# Patient Record
Sex: MALE | Race: WHITE | Employment: OTHER | URBAN - METROPOLITAN AREA
[De-identification: names, ages, dates, MRNs, and addresses within clinical notes are randomized per-mention and may not be internally consistent; named-entity substitution may affect disease eponyms.]

---

## 2017-02-22 ENCOUNTER — HOSPITAL ENCOUNTER (OUTPATIENT)
Dept: SURGERY | Age: 82
Discharge: HOME | End: 2017-02-22

## 2017-02-22 VITALS
HEIGHT: 68 IN | OXYGEN SATURATION: 95 % | HEART RATE: 72 BPM | TEMPERATURE: 97.2 F | WEIGHT: 168.13 LBS | SYSTOLIC BLOOD PRESSURE: 151 MMHG | RESPIRATION RATE: 16 BRPM | BODY MASS INDEX: 25.48 KG/M2 | DIASTOLIC BLOOD PRESSURE: 62 MMHG

## 2017-02-22 RX ORDER — ACETAMINOPHEN 325 MG/1
325 TABLET ORAL
COMMUNITY

## 2017-02-22 RX ORDER — CEPHALEXIN 500 MG/1
500 CAPSULE ORAL 4 TIMES DAILY
COMMUNITY
End: 2017-03-16

## 2017-02-22 NOTE — PERIOP NOTES
Patient verified name, , and surgery as listed in [facility]. Type 1B surgery, walk in assessment complete. Labs per surgeon: none;   Labs per anesthesia protocol: none;   EKG: none needed per protocol    Hibiclens and instructions given per hospital policy. Patient provided with handouts including Guide to Surgery, Pain Management, Hand Hygiene, Blood Transfusion Education, and [facility] Brochure. Patient answered medical/surgical history questions at their best of ability. All prior to admission medications documented in [facility]. Original medication prescription bottle NOT visualized during patient appointment. Patient instructed to hold all vitamins 7 days prior to surgery and NSAIDS 5 days prior to surgery, patient verbalized understanding. Medications to be held none    Patient instructed to continue previous medications as prescribed prior to surgery and to take the following medications the day of surgery according to anesthesia guidelines with a small sip of water: Allopurinol, Aspirin 81 mg, Coreg, Cymbalta, Minocycline, Protonix. Pt and son reminded to bring Living Will papers on DOS. Kory Quinonez Patient taught back and verbalized understanding.

## 2017-02-28 ENCOUNTER — ANESTHESIA EVENT (OUTPATIENT)
Dept: SURGERY | Age: 82
End: 2017-02-28
Payer: MEDICARE

## 2017-03-01 ENCOUNTER — HOSPITAL ENCOUNTER (OUTPATIENT)
Age: 82
Setting detail: OUTPATIENT SURGERY
Discharge: HOME | End: 2017-03-01
Attending: OTOLARYNGOLOGY | Admitting: OTOLARYNGOLOGY
Payer: MEDICARE

## 2017-03-01 ENCOUNTER — ANESTHESIA (OUTPATIENT)
Dept: SURGERY | Age: 82
End: 2017-03-01
Payer: MEDICARE

## 2017-03-01 ENCOUNTER — SURGERY (OUTPATIENT)
Age: 82
End: 2017-03-01

## 2017-03-01 VITALS
TEMPERATURE: 98 F | OXYGEN SATURATION: 92 % | SYSTOLIC BLOOD PRESSURE: 160 MMHG | RESPIRATION RATE: 20 BRPM | DIASTOLIC BLOOD PRESSURE: 76 MMHG | HEART RATE: 83 BPM | WEIGHT: 168 LBS | BODY MASS INDEX: 25.46 KG/M2 | HEIGHT: 68 IN

## 2017-03-01 PROCEDURE — 77030008477 HC STYL SATN SLP COVD -A: Performed by: ANESTHESIOLOGY

## 2017-03-01 PROCEDURE — 77030002996 HC SUT SLK J&J -A: Performed by: OTOLARYNGOLOGY

## 2017-03-01 PROCEDURE — 74011000250 HC RX REV CODE- 250

## 2017-03-01 PROCEDURE — 74011250636 HC RX REV CODE- 250/636

## 2017-03-01 PROCEDURE — 76210000000 HC OR PH I REC 2 TO 2.5 HR: Performed by: OTOLARYNGOLOGY

## 2017-03-01 PROCEDURE — 74011250637 HC RX REV CODE- 250/637: Performed by: ANESTHESIOLOGY

## 2017-03-01 PROCEDURE — 77030020782 HC GWN BAIR PAWS FLX 3M -B: Performed by: ANESTHESIOLOGY

## 2017-03-01 PROCEDURE — 88305 TISSUE EXAM BY PATHOLOGIST: CPT | Performed by: OTOLARYNGOLOGY

## 2017-03-01 PROCEDURE — 77030031139 HC SUT VCRL2 J&J -A: Performed by: OTOLARYNGOLOGY

## 2017-03-01 PROCEDURE — 77030011640 HC PAD GRND REM COVD -A: Performed by: OTOLARYNGOLOGY

## 2017-03-01 PROCEDURE — 88173 CYTOPATH EVAL FNA REPORT: CPT | Performed by: OTOLARYNGOLOGY

## 2017-03-01 PROCEDURE — 76010000153 HC OR TIME 1.5 TO 2 HR: Performed by: OTOLARYNGOLOGY

## 2017-03-01 PROCEDURE — 88331 PATH CONSLTJ SURG 1 BLK 1SPC: CPT | Performed by: OTOLARYNGOLOGY

## 2017-03-01 PROCEDURE — 77030002974 HC SUT PLN J&J -A: Performed by: OTOLARYNGOLOGY

## 2017-03-01 PROCEDURE — 74011250636 HC RX REV CODE- 250/636: Performed by: ANESTHESIOLOGY

## 2017-03-01 PROCEDURE — 77030012623 HC STIM NRV HND MEDT -B: Performed by: OTOLARYNGOLOGY

## 2017-03-01 PROCEDURE — 74011250637 HC RX REV CODE- 250/637: Performed by: OTOLARYNGOLOGY

## 2017-03-01 PROCEDURE — 88342 IMHCHEM/IMCYTCHM 1ST ANTB: CPT

## 2017-03-01 PROCEDURE — 77030008467 HC STPLR SKN COVD -B: Performed by: OTOLARYNGOLOGY

## 2017-03-01 PROCEDURE — 76210000020 HC REC RM PH II FIRST 0.5 HR: Performed by: OTOLARYNGOLOGY

## 2017-03-01 PROCEDURE — 77030008703 HC TU ET UNCUF COVD -A: Performed by: ANESTHESIOLOGY

## 2017-03-01 PROCEDURE — 76060000035 HC ANESTHESIA 2 TO 2.5 HR: Performed by: OTOLARYNGOLOGY

## 2017-03-01 PROCEDURE — 94640 AIRWAY INHALATION TREATMENT: CPT

## 2017-03-01 PROCEDURE — 77030002916 HC SUT ETHLN J&J -A: Performed by: OTOLARYNGOLOGY

## 2017-03-01 PROCEDURE — 77030018836 HC SOL IRR NACL ICUM -A: Performed by: OTOLARYNGOLOGY

## 2017-03-01 PROCEDURE — 74011000250 HC RX REV CODE- 250: Performed by: OTOLARYNGOLOGY

## 2017-03-01 PROCEDURE — 77030011267 HC ELECTRD BLD COVD -A: Performed by: OTOLARYNGOLOGY

## 2017-03-01 RX ORDER — HYDROMORPHONE HYDROCHLORIDE 2 MG/ML
0.5 INJECTION, SOLUTION INTRAMUSCULAR; INTRAVENOUS; SUBCUTANEOUS
Status: DISCONTINUED | OUTPATIENT
Start: 2017-03-01 | End: 2017-03-01 | Stop reason: HOSPADM

## 2017-03-01 RX ORDER — MIDAZOLAM HYDROCHLORIDE 1 MG/ML
2 INJECTION, SOLUTION INTRAMUSCULAR; INTRAVENOUS
Status: DISCONTINUED | OUTPATIENT
Start: 2017-03-01 | End: 2017-03-01 | Stop reason: HOSPADM

## 2017-03-01 RX ORDER — FAMOTIDINE 20 MG/1
20 TABLET, FILM COATED ORAL ONCE
Status: COMPLETED | OUTPATIENT
Start: 2017-03-01 | End: 2017-03-01

## 2017-03-01 RX ORDER — ALBUTEROL SULFATE 0.83 MG/ML
SOLUTION RESPIRATORY (INHALATION)
Status: COMPLETED
Start: 2017-03-01 | End: 2017-03-01

## 2017-03-01 RX ORDER — ROCURONIUM BROMIDE 10 MG/ML
INJECTION, SOLUTION INTRAVENOUS AS NEEDED
Status: DISCONTINUED | OUTPATIENT
Start: 2017-03-01 | End: 2017-03-01 | Stop reason: HOSPADM

## 2017-03-01 RX ORDER — FENTANYL CITRATE 50 UG/ML
100 INJECTION, SOLUTION INTRAMUSCULAR; INTRAVENOUS ONCE
Status: DISCONTINUED | OUTPATIENT
Start: 2017-03-01 | End: 2017-03-01 | Stop reason: HOSPADM

## 2017-03-01 RX ORDER — ACETAMINOPHEN 325 MG/1
650 TABLET ORAL ONCE
Status: COMPLETED | OUTPATIENT
Start: 2017-03-01 | End: 2017-03-01

## 2017-03-01 RX ORDER — PROPOFOL 10 MG/ML
INJECTION, EMULSION INTRAVENOUS AS NEEDED
Status: DISCONTINUED | OUTPATIENT
Start: 2017-03-01 | End: 2017-03-01 | Stop reason: HOSPADM

## 2017-03-01 RX ORDER — GLYCOPYRROLATE 0.2 MG/ML
INJECTION INTRAMUSCULAR; INTRAVENOUS AS NEEDED
Status: DISCONTINUED | OUTPATIENT
Start: 2017-03-01 | End: 2017-03-01 | Stop reason: HOSPADM

## 2017-03-01 RX ORDER — SODIUM CHLORIDE, SODIUM LACTATE, POTASSIUM CHLORIDE, CALCIUM CHLORIDE 600; 310; 30; 20 MG/100ML; MG/100ML; MG/100ML; MG/100ML
75 INJECTION, SOLUTION INTRAVENOUS CONTINUOUS
Status: DISCONTINUED | OUTPATIENT
Start: 2017-03-01 | End: 2017-03-01 | Stop reason: HOSPADM

## 2017-03-01 RX ORDER — HYDROCODONE BITARTRATE AND ACETAMINOPHEN 5; 325 MG/1; MG/1
2 TABLET ORAL AS NEEDED
Status: DISCONTINUED | OUTPATIENT
Start: 2017-03-01 | End: 2017-03-01 | Stop reason: HOSPADM

## 2017-03-01 RX ORDER — FENTANYL CITRATE 50 UG/ML
INJECTION, SOLUTION INTRAMUSCULAR; INTRAVENOUS AS NEEDED
Status: DISCONTINUED | OUTPATIENT
Start: 2017-03-01 | End: 2017-03-01 | Stop reason: HOSPADM

## 2017-03-01 RX ORDER — BACITRACIN ZINC 500 UNIT/G
OINTMENT (GRAM) TOPICAL AS NEEDED
Status: DISCONTINUED | OUTPATIENT
Start: 2017-03-01 | End: 2017-03-01 | Stop reason: HOSPADM

## 2017-03-01 RX ORDER — LIDOCAINE HYDROCHLORIDE 10 MG/ML
0.1 INJECTION INFILTRATION; PERINEURAL AS NEEDED
Status: DISCONTINUED | OUTPATIENT
Start: 2017-03-01 | End: 2017-03-01 | Stop reason: HOSPADM

## 2017-03-01 RX ORDER — LIDOCAINE HYDROCHLORIDE 20 MG/ML
INJECTION, SOLUTION EPIDURAL; INFILTRATION; INTRACAUDAL; PERINEURAL AS NEEDED
Status: DISCONTINUED | OUTPATIENT
Start: 2017-03-01 | End: 2017-03-01 | Stop reason: HOSPADM

## 2017-03-01 RX ORDER — ALBUTEROL SULFATE 0.83 MG/ML
2.5 SOLUTION RESPIRATORY (INHALATION)
Status: COMPLETED | OUTPATIENT
Start: 2017-03-01 | End: 2017-03-01

## 2017-03-01 RX ORDER — SUCCINYLCHOLINE CHLORIDE 20 MG/ML
INJECTION INTRAMUSCULAR; INTRAVENOUS AS NEEDED
Status: DISCONTINUED | OUTPATIENT
Start: 2017-03-01 | End: 2017-03-01 | Stop reason: HOSPADM

## 2017-03-01 RX ORDER — ONDANSETRON 2 MG/ML
INJECTION INTRAMUSCULAR; INTRAVENOUS AS NEEDED
Status: DISCONTINUED | OUTPATIENT
Start: 2017-03-01 | End: 2017-03-01 | Stop reason: HOSPADM

## 2017-03-01 RX ORDER — DEXAMETHASONE SODIUM PHOSPHATE 4 MG/ML
INJECTION, SOLUTION INTRA-ARTICULAR; INTRALESIONAL; INTRAMUSCULAR; INTRAVENOUS; SOFT TISSUE AS NEEDED
Status: DISCONTINUED | OUTPATIENT
Start: 2017-03-01 | End: 2017-03-01 | Stop reason: HOSPADM

## 2017-03-01 RX ORDER — OXYCODONE HYDROCHLORIDE 5 MG/1
5 TABLET ORAL
Status: DISCONTINUED | OUTPATIENT
Start: 2017-03-01 | End: 2017-03-01 | Stop reason: HOSPADM

## 2017-03-01 RX ORDER — MIDAZOLAM HYDROCHLORIDE 1 MG/ML
5 INJECTION, SOLUTION INTRAMUSCULAR; INTRAVENOUS ONCE
Status: DISCONTINUED | OUTPATIENT
Start: 2017-03-01 | End: 2017-03-01 | Stop reason: HOSPADM

## 2017-03-01 RX ORDER — OXYMETAZOLINE HCL 0.05 %
SPRAY, NON-AEROSOL (ML) NASAL AS NEEDED
Status: DISCONTINUED | OUTPATIENT
Start: 2017-03-01 | End: 2017-03-01 | Stop reason: HOSPADM

## 2017-03-01 RX ORDER — LIDOCAINE HYDROCHLORIDE AND EPINEPHRINE 10; 10 MG/ML; UG/ML
INJECTION, SOLUTION INFILTRATION; PERINEURAL AS NEEDED
Status: DISCONTINUED | OUTPATIENT
Start: 2017-03-01 | End: 2017-03-01 | Stop reason: HOSPADM

## 2017-03-01 RX ADMIN — PROPOFOL 40 MG: 10 INJECTION, EMULSION INTRAVENOUS at 08:39

## 2017-03-01 RX ADMIN — OXYMETAZOLINE HYDROCHLORIDE 15 ML: 5 SPRAY NASAL at 09:22

## 2017-03-01 RX ADMIN — HYDROMORPHONE HYDROCHLORIDE 0.5 MG: 2 INJECTION, SOLUTION INTRAMUSCULAR; INTRAVENOUS; SUBCUTANEOUS at 10:15

## 2017-03-01 RX ADMIN — LIDOCAINE HYDROCHLORIDE AND EPINEPHRINE 6 ML: 10; 10 INJECTION, SOLUTION INFILTRATION; PERINEURAL at 09:44

## 2017-03-01 RX ADMIN — ALBUTEROL SULFATE 2.5 MG: 0.83 SOLUTION RESPIRATORY (INHALATION) at 11:42

## 2017-03-01 RX ADMIN — ALBUTEROL SULFATE 2.5 MG: 2.5 SOLUTION RESPIRATORY (INHALATION) at 11:42

## 2017-03-01 RX ADMIN — DEXAMETHASONE SODIUM PHOSPHATE 10 MG: 4 INJECTION, SOLUTION INTRA-ARTICULAR; INTRALESIONAL; INTRAMUSCULAR; INTRAVENOUS; SOFT TISSUE at 08:33

## 2017-03-01 RX ADMIN — FAMOTIDINE 20 MG: 20 TABLET ORAL at 07:16

## 2017-03-01 RX ADMIN — PROPOFOL 30 MG: 10 INJECTION, EMULSION INTRAVENOUS at 09:28

## 2017-03-01 RX ADMIN — Medication 14 G: at 09:17

## 2017-03-01 RX ADMIN — ROCURONIUM BROMIDE 5 MG: 10 INJECTION, SOLUTION INTRAVENOUS at 08:12

## 2017-03-01 RX ADMIN — ONDANSETRON 4 MG: 2 INJECTION INTRAMUSCULAR; INTRAVENOUS at 09:52

## 2017-03-01 RX ADMIN — FENTANYL CITRATE 50 MCG: 50 INJECTION, SOLUTION INTRAMUSCULAR; INTRAVENOUS at 08:55

## 2017-03-01 RX ADMIN — GLYCOPYRROLATE 0.2 MG: 0.2 INJECTION INTRAMUSCULAR; INTRAVENOUS at 09:24

## 2017-03-01 RX ADMIN — PROPOFOL 100 MG: 10 INJECTION, EMULSION INTRAVENOUS at 08:12

## 2017-03-01 RX ADMIN — SUCCINYLCHOLINE CHLORIDE 100 MG: 20 INJECTION INTRAMUSCULAR; INTRAVENOUS at 08:12

## 2017-03-01 RX ADMIN — LIDOCAINE HYDROCHLORIDE 60 MG: 20 INJECTION, SOLUTION EPIDURAL; INFILTRATION; INTRACAUDAL; PERINEURAL at 08:12

## 2017-03-01 RX ADMIN — ACETAMINOPHEN 650 MG: 325 TABLET, FILM COATED ORAL at 11:22

## 2017-03-01 RX ADMIN — SODIUM CHLORIDE, SODIUM LACTATE, POTASSIUM CHLORIDE, AND CALCIUM CHLORIDE: 600; 310; 30; 20 INJECTION, SOLUTION INTRAVENOUS at 09:19

## 2017-03-01 RX ADMIN — PROPOFOL 50 MG: 10 INJECTION, EMULSION INTRAVENOUS at 08:26

## 2017-03-01 RX ADMIN — SODIUM CHLORIDE, SODIUM LACTATE, POTASSIUM CHLORIDE, AND CALCIUM CHLORIDE 75 ML/HR: 600; 310; 30; 20 INJECTION, SOLUTION INTRAVENOUS at 07:39

## 2017-03-01 RX ADMIN — FENTANYL CITRATE 50 MCG: 50 INJECTION, SOLUTION INTRAMUSCULAR; INTRAVENOUS at 08:12

## 2017-03-01 NOTE — ANESTHESIA POSTPROCEDURE EVALUATION
Post-Anesthesia Evaluation and Assessment    Patient: Junito Bartlett MRN: [id number]  SSN: [id number]    YOB: 1928  Age: 80 y.o. Sex: male       Cardiovascular Function/Vital Signs  Visit Vitals    /76    Pulse 83    Temp 36.7 °C (98 °F)    Resp 20    Ht 5' 8\" (1.727 m)    Wt 76.2 kg (168 lb)    SpO2 92%    BMI 25.54 kg/m2       Patient is status post general anesthesia for Procedure(s):  EXCISIONAL BIOPSY OF RIGHT NECK MASS WITH FROZEN SECTION  DIRECT LARYNGOSCOPY WITH BIOPSY. Nausea/Vomiting: None    Postoperative hydration reviewed and adequate. Pain:  Pain Scale 1: Visual (03/01/17 1047)  Pain Intensity 1: 0 (03/01/17 1047)   Managed    Neurological Status:   Neuro (WDL): Within Defined Limits (03/01/17 1047)  Neuro  Neurologic State: Alert (03/01/17 1047)  Orientation Level: Oriented to person;Oriented to place (03/01/17 1047)  LUE Motor Response: Purposeful (03/01/17 1047)  LLE Motor Response: Purposeful (03/01/17 1047)  RUE Motor Response: Purposeful (03/01/17 1047)  RLE Motor Response: Purposeful (03/01/17 1047)   At baseline    Mental Status and Level of Consciousness: Arousable    Pulmonary Status:   O2 Device: Room air (03/01/17 1157)   Adequate oxygenation and airway patent    Complications related to anesthesia: None    Post-anesthesia assessment completed. No concerns. SpO2 stable after breathing treatment (has been >90% on RA). VS otherwise stable. Pain controlled. I believe he his safe for discharge.     Signed By: Sydni Juarez MD     March 1, 2017

## 2017-03-01 NOTE — ANESTHESIA PREPROCEDURE EVALUATION
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 Cardiovascular    Hypertension: well controlled              Exercise tolerance: >4 METS: Uses walker, cannot do stairs, but doesn't get SOB on flat surface     GI/Hepatic/Renal     GERD: well controlled           Endo/Other        Arthritis and cancer (H/o colon cancer, s/p surgery and chemo)     Other Findings              Physical Exam    Airway  Mallampati: III  TM Distance: > 6 cm  Neck ROM: normal range of motion   Mouth opening: Normal     Cardiovascular  Regular rate and rhythm,  S1 and S2 normal,  no murmur, click, rub, or gallop  Rhythm: regular  Rate: normal         Dental    Dentition: Edentulous     Pulmonary  Breath sounds clear to auscultation               Abdominal  GI exam deferred       Other Findings            Anesthetic Plan    ASA: 3  Anesthesia type: general          Induction: Intravenous  Anesthetic plan and risks discussed with: Patient and Son / Daughter

## 2017-03-01 NOTE — BRIEF OP NOTE
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]      Procedure(s):  EXCISIONAL BIOPSY OF RIGHT NECK MASS WITH FROZEN SECTION  DIRECT LARYNGOSCOPY WITH BIOPSY    Surgeon(s) and Role:     * Starr Benson MD - Primary    Surgical Staff:  Circ-1: Darvin Alvarez RN  Circ-2: Amanda Fonseca RN  Circ-Relief: Lydia Stein RN  Scrub Tech-1: Fadumo Dotson  Scrub Tech-2: Hector Frye    Event Time In   Incision Start 0825   Incision Close 0950     Anesthesia: General     IVF: 1600 cc    Estimated Blood Loss: 40 cc    Specimens:   ID Type Source Tests Collected by Time Destination   1 : right neck mass Frozen Section Neck  Satrr Benson MD 3/1/2017 0900 Pathology   2 : Right nasopharynx bx Preservative Sinus  Starr Benson MD 3/1/2017 4113 Pathology   3 : Right pyriform sinus Preservative Sinus  Starr Benson MD 3/1/2017 0940 Pathology   4 : Right base of tongue Preservative Tongue  Starr Benson MD 3/1/2017 3198 Pathology   5 : Right tonsil bx Preservative Tonsil  Starr Benson MD 3/1/2017 6627 Pathology   1 : Right neck mass Special Studies (Specify) Neck  Starr Benson MD 3/1/2017 0761 Cytology      Findings: 2.5 firm R level 5 neck mass- positive for SCCA on frozen section- no obvious lesions on laryngoscopy other than some firmness of R NP    Complications: none    Implants: * No implants in log *

## 2017-03-01 NOTE — IP AVS SNAPSHOT
[address] [place]  
[phone] Patient: Kendell Lisa MRN: [id number] :10/17/1928 You are allergic to the following Allergen Reactions Codeine Nausea Only Recent Documentation Height  
  
  
  
  
  
 1.727 m Emergency Contacts Name Discharge Info Relation Home Work Mobile Elvira Diggs DISCHARGE CAREGIVER [3] Spouse [3] [phone] Pavithra Restrepo CAREGIVER [3] Daughter [21] [phone] Carolyn Soco [22] [phone] About your hospitalization You were admitted on:  2017 You last received care in the:  [facility] PACU You were discharged on:  2017 Unit phone number:  [phone] Why you were hospitalized Your primary diagnosis was:  Not on File Providers Seen During Your Hospitalizations Provider Role Specialty Primary office phone Jennie Gtz MD Attending Provider Otolaryngology [phone] Your Primary Care Physician (PCP) Primary Care Physician Office Phone Office Fax Boo Bal [address] [facility],  [id number] Follow-up Information Follow up With Details Comments Contact Info Claire Brown   [address] [id number] [phone] Jennie Gtz MD Schedule an appointment as soon as possible for a visit in 1 week(s)  [address] [address] 
[phone] Your Appointments 2017  2:00 PM EST Follow Up with Claire Brown [address] [facility] ([facility]) [address] [facility] [address] [place] [id number]  
[phone] 2017  3:45 PM EST  
POST OP with MD Min Gautamusnell [address] ENT 8[address] [facility]) [address]  
[phone] Current Discharge Medication List  
  
CONTINUE these medications which have CHANGED Dose & Instructions Dispensing Information Comments Morning Noon Evening Bedtime  
 allopurinol 100 mg tablet Commonly known as:  Nani Gosling What changed:  additional instructions Your next dose is: Today, Tomorrow Other:  _________ Dose:  200 mg Take 2 Tabs by mouth daily. Quantity:  60 Tab Refills:  5  
     
   
   
   
  
 carvedilol 12.5 mg tablet Commonly known as:  Coralondra Resendez What changed:   
- how much to take 
- how to take this - when to take this 
- additional instructions Your next dose is: Today, Tomorrow Other:  _________ Take by mouth 1 tablet in AM and 2 tablets in PM  
 Quantity:  270 Tab Refills:  3 DULoxetine 60 mg capsule Commonly known as:  CYMBALTA What changed:  additional instructions Your next dose is: Today, Tomorrow Other:  _________ Dose:  60 mg Take 1 Cap by mouth daily. Quantity:  90 Cap Refills:  3  
     
   
   
   
  
 furosemide 20 mg tablet Commonly known as:  LASIX What changed:   
- how much to take 
- how to take this - when to take this 
- additional instructions Your next dose is: Today, Tomorrow Other:  _________ Take by mouth daily as directed in the morning Quantity:  90 Tab Refills:  11  
     
   
   
   
  
 pantoprazole 20 mg tablet Commonly known as:  PROTONIX What changed:  additional instructions Your next dose is: Today, Tomorrow Other:  _________ Dose:  20 mg Take 1 Tab by mouth daily. Quantity:  90 Tab Refills:  3  
     
   
   
   
  
 tamsulosin 0.4 mg capsule Commonly known as:  FLOMAX What changed:  when to take this Your next dose is: Today, Tomorrow Other:  _________ Dose:  0.4 mg Take 1 Cap by mouth daily. Quantity:  90 Cap Refills:  3 CONTINUE these medications which have NOT CHANGED Dose & Instructions Dispensing Information Comments Morning Noon Evening Bedtime  
 aspirin delayed-release 81 mg tablet Your next dose is: Today, Tomorrow Other:  _________ Dose:  81 mg Take 81 mg by mouth daily. Take / use AM day of surgery  per anesthesia protocols. Indications: OSTEOARTHRITIS Refills:  0  
     
   
   
   
  
 * HYDROcodone-acetaminophen  mg tablet Commonly known as:  Felicitas Hoang Your next dose is: Today, Tomorrow Other:  _________ Dose:  1 Tab Take 1 Tab by mouth nightly. Max Daily Amount: 1 Tab. Quantity:  30 Tab Refills:  0  
     
   
   
   
  
 * HYDROcodone-acetaminophen 5-325 mg per tablet Commonly known as:  Felicitas Hoang Your next dose is: Today, Tomorrow Other:  _________  
   
   
 1-2 tabs every 4-6 hrs prn pain Quantity:  30 Tab Refills:  0 KEFLEX 500 mg capsule Generic drug:  cephALEXin Your next dose is: Today, Tomorrow Other:  _________ Dose:  500 mg Take 500 mg by mouth four (4) times daily. Refills:  0  
     
   
   
   
  
 magnesium oxide 400 mg tablet Commonly known as:  MAG-OX Your next dose is: Today, Tomorrow Other:  _________ Dose:  400 mg Take 1 Tab by mouth daily. Quantity:  90 Tab Refills:  2  
     
   
   
   
  
 minocycline 50 mg tablet Commonly known as:  Divine Born Your next dose is: Today, Tomorrow Other:  _________ Dose:  50 mg Take 50 mg by mouth daily. Taking for continual eye infection ~preventative maintenance  Take / use AM day of surgery  per anesthesia protocols. Refills:  0  
     
   
   
   
  
 * ondansetron 4 mg disintegrating tablet Commonly known as:  ZOFRAN ODT Your next dose is: Today, Tomorrow Other:  _________ Dose:  4 mg Take 1 Tab by mouth every eight (8) hours as needed for Nausea. Quantity:  90 Tab Refills:  1  
     
   
   
   
  
 * ondansetron 8 mg disintegrating tablet Commonly known as:  ZOFRAN ODT Your next dose is: Today, Tomorrow Other:  _________ Dose:  8 mg Take 1 Tab by mouth every eight (8) hours as needed for Nausea. Quantity:  8 Tab Refills:  0  
     
   
   
   
  
 OTHER Your next dose is: Today, Tomorrow Other:  _________  
   
   
 daily. REnavite tablet Refills:  0  
     
   
   
   
  
 TYLENOL 325 mg tablet Generic drug:  acetaminophen Your next dose is: Today, Tomorrow Other:  _________ Dose:  325 mg Take 325 mg by mouth every four (4) hours as needed for Pain. Indications: Arthritic Pain Refills:  0  
     
   
   
   
  
 * Notice: This list has 4 medication(s) that are the same as other medications prescribed for you. Read the directions carefully, and ask your doctor or other care provider to review them with you. Discharge Instructions   
  
-Please keep pressure dressing in place for 24 hrs- then you may remove and leave the incision open to air. You may shower/bathe as long as this neck incision stays away from the water. 
-Please apply a thin layer of Vaseline over the neck incision 7-8 times a day to help the sutures dissolve 
-No strenuous activity or heavy lifting for 2 wks 
-There will be some mild oozing from nose/throat for first few days due to the biopsies. Instructions Following Ambulatory Surgery Activity · As tolerated and directed by your doctor · Bathe or shower as directed by your doctor Diet · Clear liquids until no nausea or vomiting; then light diet for the first day · Advance to regular diet on second day, unless your doctor orders otherwise · If nausea and vomiting continues, call your doctor Pain · Take pain medication as directed by your doctor ·  Call your doctor if pain is NOT relieved by medication · DO NOT take aspirin or blood thinners until directed by your doctor Follow-Up Phone Calls · Will be made nursing staff · If you have any problems, call your doctor as needed Call your doctor if 
· Excessive bleeding that does not stop after holding mild pressure over the area · Temperature of 101 degrees F or above · Redness,excessive swelling or bruising, and/or green or yellow, smelly discharge from incision After Anesthesia · For the first 24 hours: DO NOT Drive, Drink alcoholic beverages, or Make important decisions · Be aware of dizziness following anesthesia and while taking pain medication Discharge Orders None ACO Transitions of Care Introducing [employer] offers a voluntary care coordination program to provide high quality service and care to [facility] fee-for-service beneficiaries. Marlen Watson was designed to help you enhance your health and well-being through the following services: ? Transitions of Care  support for individuals who are transitioning from one care setting to another (example: Hospital to home). ? Chronic and Complex Care Coordination  support for individuals and caregivers of those with serious or chronic illnesses or with more than one chronic (ongoing) condition and those who take a number of different medications. If you meet specific medical criteria, a [facility] [address] may call you directly to coordinate your care with your primary care physician and your other care providers. For questions about the [facility] programs, please, contact your physicians office. For general questions or additional information about Accountable Care Organizations: 
Please visit www.medicare.gov/acos. html or call 1-800-MEDICARE (5-985.146.3614) TTY users should call 2-974.760.7574. Introducing [facility]! Jannet Braswell introduces [employer] patient portal. Now you can access parts of your medical record, email your doctor's office, and request medication refills online. 1. In your internet browser, go to [email or web address] "[email or web address] 2. Click on the First Time User? Click Here link in the Sign In box. You will see the New Member Sign Up page. 3. Enter your [employer] Access Code exactly as it appears below. You will not need to use this code after youve completed the sign-up process. If you do not sign up before the expiration date, you must request a new code. · [employer] Access Code: [email or web address] Expires: 4/5/2017  7:33 AM 
 
4. Enter the last four digits of your Social Security Number (xxxx) and Date of Birth (mm/dd/yyyy) as indicated and click Submit. You will be taken to the next sign-up page. 5. Create a [employer] ID. This will be your [employer] login ID and cannot be changed, so think of one that is secure and easy to remember. 6. Create a [employer] password. You can change your password at any time. 7. Enter your Password Reset Question and Answer. This can be used at a later time if you forget your password. 8. Enter your e-mail address. You will receive e-mail notification when new information is available in [address] 9. Click Sign Up. You can now view and download portions of your medical record. 10. Click the Download Summary menu link to download a portable copy of your medical information. If you have questions, please visit the Frequently Asked Questions section of the [employer] website. Remember, [employer] is NOT to be used for urgent needs. For medical emergencies, dial 911. Now available from your iPhone and Android! General Information Please provide this summary of care documentation to your next provider. Patient Signature:  ____________________________________________________________ Date:  ____________________________________________________________  
  
Paulene Greener Provider Signature:  ____________________________________________________________ Date:  ____________________________________________________________

## 2017-03-01 NOTE — H&P
79 yo male seen recently for an enlarged R neck mass- presented over 1 mo ago and has been getting larger in size. Mild soreness over the mass but no other sxs. He has had wt loss of 35 lbs in last 6 mo. He saw Dr. Miguel Sparks recently and underwent FNA which revealed some atypical squamous cell, suspicious for malignancy. Visit Vitals    /67 (BP 1 Location: Left arm, BP Patient Position: At rest;Supine)    Pulse 66    Temp 98.5 °F (36.9 °C)    Resp 16    Ht 5' 8\" (1.727 m)    Wt 76.2 kg (168 lb)    SpO2 96%    BMI 25.54 kg/m2     General: NAD, well-appearing. Gait assisted w/ walker. Neuro: No gross neuro deficits. CN's II-XII intact. No facial weakness. Eyes: EOMI. Pupils reactive. No periorbital edema/ecchymosis. Skin: No facial erythema, rashes or concerning lesions. Nose: No external deviations or saddling. Intranasally, septum is deviated off to R side inferiorly, without perforations, nasal mucosa appears healthy with no erythema, mucopurulence, or polyps. Mouth: Edentulous- upper plate in place. Moist mucus membranes, normal tongue/palate mobility, no concerning mucosal lesions. Oropharynx clear with no erythema/exudate, no tonsillar hypertrophy. Ears: Normal appearing auricles, no hematomas. EACs clear with no cerumen impaction, healthy canal skin, TM's intact with no perforations or retraction pockets. No middle ear effusions. Neck: Soft, supple, there is a 2 cm firm but mobile and fairly superficial R level 5 neck mass- posterior to SCM. No thyromegaly or palpable thyroid nodules. No surgical scars. Lymphatics: No palpable cervical LAD on L side. Resp: No audible stridor or wheezing. Extremities: No clubbing or cyanosis. A/P:  He has a 2 cm R level 5 neck mass which was suspicious for malignancy on recent FNA. On endoscopy, there was some fullness along the R side of his nasopharynx but no other concerning areas.  His CT scan revealed only this enlarged R level 5 neck mass and no other pathologic LAD or concerning mucosal lesions. I recommend proceeding w/ excisional bx of R neck mass w/ frozen section and possible DL/E w/ biopsy. I discussed all the risks of surgery including bleeding/hematoma, infection, ear numbness, shoulder weakness, recurrence, damage to lips/gums, and need for further treatment/procedures, and he would like to proceed.     José Miguel Jones MD

## 2017-03-01 NOTE — DISCHARGE INSTRUCTIONS
-Please keep pressure dressing in place for 24 hrs- then you may remove and leave the incision open to air. You may shower/bathe as long as this neck incision stays away from the water.  -Please apply a thin layer of Vaseline over the neck incision 7-8 times a day to help the sutures dissolve  -No strenuous activity or heavy lifting for 2 wks  -There will be some mild oozing from nose/throat for first few days due to the biopsies.       Instructions Following Ambulatory Surgery    Activity  · As tolerated and directed by your doctor  · Bathe or shower as directed by your doctor    Diet  · Clear liquids until no nausea or vomiting; then light diet for the first day  · Advance to regular diet on second day, unless your doctor orders otherwise  · If nausea and vomiting continues, call your doctor    Pain  · Take pain medication as directed by your doctor  ·  Call your doctor if pain is NOT relieved by medication  · DO NOT take aspirin or blood thinners until directed by your doctor    Follow-Up Phone Calls  · Will be made nursing staff  · If you have any problems, call your doctor as needed    Call your doctor if  · Excessive bleeding that does not stop after holding mild pressure over the area  · Temperature of 101 degrees F or above  · Redness,excessive swelling or bruising, and/or green or yellow, smelly discharge from incision    After Anesthesia  · For the first 24 hours: DO NOT Drive, Drink alcoholic beverages, or Make important decisions  · Be aware of dizziness following anesthesia and while taking pain medication

## 2017-03-02 NOTE — OP NOTES
[address] 65   OPERATIVE REPORT       Name:  Darling See   MR#:  [id number]   :  10/17/1928   Account #:  [de-identified]   Date of Adm:  2017       DATE OF SURGERY: 2017     PREOPERATIVE DIAGNOSIS: Right neck mass. 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 PROCEDURE   1. Excisional biopsy of deep right cervical lymph node. 2. Direct laryngoscopy with biopsy. OPERATIVE SURGEON: Tonio Anderson. Escobar Romano MD    ANESTHESIA: General endotracheal.    ANESTHESIOLOGIST: Alycia Layne MD    OPERATIVE FINDINGS   1. There was a 2.5 cm, firm lymph node within the right level 5. Excisional biopsy of this lymph node was performed. 2. The spinal accessory nerve was running directly through the   mass and had to be sacrificed. 3. Frozen section pathology confirmed squamous cell carcinoma   and, therefore, direct laryngoscopy was performed with speculative biopsies   along the right side. 4. There was some firmness of the right nasopharynx, but no   obvious ulcerative or exophytic lesions within the upper   aerodigestive tract. INTRAVENOUS FLUID: 1600 mL crystalloid. ESTIMATED BLOOD LOSS: 40 mL. SPECIMENS   1. Right neck mass--frozen. 2. Right nasopharynx--permanent. 3. Right pyriform sinus--permanent. 4. Right base of tongue--permanent. 5. Right tonsil biopsy--permanent. DRAINS: None. COMPLICATIONS: None. DISPOSITION: PACU, then home. CONDITION: Stable. BRIEF HISTORY: Mr. Beatriz Santacruz is an 69-year-old male who presented to   my office with an enlarging right posterior neck mass. He   underwent fine needle aspirate Dr. Dustin Quiros, which revealed some   atypical squamous cells concerning for malignancy. I scoped him   in the office and there was some mild asymmetry along the right   nasopharynx, but no other abnormalities of the upper   aerodigestive tract.  Decision was made to take him to the   operating room for excisional biopsy of this right neck mass,   followed by direct laryngoscopy, depending on pathology. DESCRIPTION OF PROCEDURE: The patient was brought back to the   operating room, placed on the table in a supine position. General endotracheal anesthesia was inducted without any   complications. Once the patient was adequately sedated, a total   of 6 mL of 1% lidocaine with 1:100,000 epinephrine was injected   along the planned incision line. He was then sterilely prepped   and draped in the usual fashion. I began by designing a 4 cm incision along a natural neck skin   crease of the right posterior neck. I incised through the skin   and dermis with a 15 blade and then began careful blunt   dissection through some scant platysma muscle. Just deep to the   platysma was a firm mass, consistent with preoperative   radiographic findings. I began careful blunt dissection in all   directions around the mass using bipolar electrocautery. I   identified the posterior border of the sternocleidomastoid   muscle and then I identified the spinal accessory nerve which   unfortunately ran directly into the mass. I found the proximal   and distal ends of the nerve and used careful blunt dissection   to try to separate it from the mass, but it became apparent that   the nerve ran directly into the tumor and therefore had to be   sacrificed. After dividing the nerve both proximally and   distally, I was able to gain better control along the inferior   aspect of the mass and extended back towards the posterior   triangle muscles. I completed dissection of the mass and it was   passed off for frozen section pathology. While awaiting frozen section results, I irrigated out the wound   and ensured adequate hemostasis using bipolar electrocautery. Surgical pathology called and confirmed that this was a squamous   cell carcinoma.  As this was an unusual site for squamous cell   carcinoma within the posterior triangle and, due to the   patient's age, a completion neck dissection was not performed   due to risk for increased morbidity. The incision was closed in   layers using 3-0 undyed Vicryl deep sutures and 6-0 fast   absorbing gut in a running and locking fashion for the cutaneous   layer. Bacitracin was placed over the incision, followed by a   pressure dressing using fluffs and Marlee wrap. Next, I proceeded with laryngoscopy to look for potential   primary source of this squamous cell carcinoma. I began with a   Dedo laryngoscope, and used it to visualize the entire larynx   and pharynx. Posterior pharyngeal wall and both piriform   sinuses were clear. The glottis was visualized and both aryepiglottic folds were   clear. Palpation of the right tonsil and tongue   base was normal. Speculative biopsies were performed along the right base of   tongue, the right pyriform sinus, and the right tonsillar fossa. All bleeding was controlled using Neuro Patties soaked in Afrin. I palpated the nasopharynx because this site had been previously   seen to be slightly asymmetric on the preoperative laryngoscopy. I used a 0 degree scope to visualize this area directly and,   although there was no obvious ulcer or exophytic lesion, it was   slightly asymmetric on the right side. Therefore, several   superficial and deep biopsies were taken, using a cup forceps. Again, all bleeding was controlled using pledgets soaked in   Afrin. Once hemostasis was ensured, all instrumentation was removed,   and he was turned back to the anesthesia team, extubated, and   taken to the PACU in stable condition afterwards.         MD KIM Barrett / MARIANNE   D:  03/01/2017   10:18   T:  03/02/2017   06:43   Job #:  [id number]

## 2017-03-09 PROBLEM — K21.9 GERD (GASTROESOPHAGEAL REFLUX DISEASE): Chronic | Status: ACTIVE | Noted: 2017-03-09

## 2017-03-09 PROBLEM — C44.42 SQUAMOUS CELL CANCER OF SCALP AND SKIN OF NECK: Chronic | Status: ACTIVE | Noted: 2017-03-09

## 2017-03-09 PROBLEM — M10.9 GOUT: Chronic | Status: ACTIVE | Noted: 2017-03-09

## 2017-03-16 ENCOUNTER — HOSPITAL ENCOUNTER (EMERGENCY)
Age: 82
Discharge: HOME | End: 2017-03-16
Attending: EMERGENCY MEDICINE
Payer: MEDICARE

## 2017-03-16 VITALS
HEART RATE: 78 BPM | BODY MASS INDEX: 25.76 KG/M2 | HEIGHT: 68 IN | WEIGHT: 170 LBS | TEMPERATURE: 98.2 F | SYSTOLIC BLOOD PRESSURE: 131 MMHG | RESPIRATION RATE: 16 BRPM | DIASTOLIC BLOOD PRESSURE: 56 MMHG | OXYGEN SATURATION: 95 %

## 2017-03-16 DIAGNOSIS — R04.0 EPISTAXIS: Primary | ICD-10-CM

## 2017-03-16 LAB
ANION GAP BLD CALC-SCNC: 8 MMOL/L (ref 7–16)
APTT PPP: 25.2 SEC (ref 25.3–32.9)
BASOPHILS # BLD AUTO: 0.1 K/UL (ref 0–0.2)
BASOPHILS # BLD: 1 % (ref 0–2)
BUN SERPL-MCNC: 18 MG/DL (ref 8–23)
CALCIUM SERPL-MCNC: 8.6 MG/DL (ref 8.3–10.4)
CHLORIDE SERPL-SCNC: 102 MMOL/L (ref 98–107)
CO2 SERPL-SCNC: 29 MMOL/L (ref 21–32)
CREAT SERPL-MCNC: 1.19 MG/DL (ref 0.8–1.5)
DIFFERENTIAL METHOD BLD: ABNORMAL
EOSINOPHIL # BLD: 0.5 K/UL (ref 0–0.8)
EOSINOPHIL NFR BLD: 8 % (ref 0.5–7.8)
ERYTHROCYTE [DISTWIDTH] IN BLOOD BY AUTOMATED COUNT: 14.7 % (ref 11.9–14.6)
GLUCOSE SERPL-MCNC: 120 MG/DL (ref 65–100)
HCT VFR BLD AUTO: 37.1 % (ref 41.1–50.3)
HGB BLD-MCNC: 11.8 G/DL (ref 13.6–17.2)
IMM GRANULOCYTES # BLD: 0 K/UL (ref 0–0.5)
IMM GRANULOCYTES NFR BLD AUTO: 0.3 % (ref 0–5)
INR PPP: 1 (ref 0.9–1.2)
LYMPHOCYTES # BLD AUTO: 22 % (ref 13–44)
LYMPHOCYTES # BLD: 1.5 K/UL (ref 0.5–4.6)
MCH RBC QN AUTO: 33.8 PG (ref 26.1–32.9)
MCHC RBC AUTO-ENTMCNC: 31.8 G/DL (ref 31.4–35)
MCV RBC AUTO: 106.3 FL (ref 79.6–97.8)
MONOCYTES # BLD: 0.4 K/UL (ref 0.1–1.3)
MONOCYTES NFR BLD AUTO: 6 % (ref 4–12)
NEUTS SEG # BLD: 4.3 K/UL (ref 1.7–8.2)
NEUTS SEG NFR BLD AUTO: 63 % (ref 43–78)
PLATELET # BLD AUTO: 157 K/UL (ref 150–450)
PMV BLD AUTO: 9.3 FL (ref 10.8–14.1)
POTASSIUM SERPL-SCNC: 3.9 MMOL/L (ref 3.5–5.1)
PROTHROMBIN TIME: 10.2 SEC (ref 9.6–12)
RBC # BLD AUTO: 3.49 M/UL (ref 4.23–5.67)
SODIUM SERPL-SCNC: 139 MMOL/L (ref 136–145)
WBC # BLD AUTO: 6.9 K/UL (ref 4.3–11.1)

## 2017-03-16 PROCEDURE — 80048 BASIC METABOLIC PNL TOTAL CA: CPT | Performed by: EMERGENCY MEDICINE

## 2017-03-16 PROCEDURE — 85730 THROMBOPLASTIN TIME PARTIAL: CPT | Performed by: EMERGENCY MEDICINE

## 2017-03-16 PROCEDURE — 85610 PROTHROMBIN TIME: CPT | Performed by: EMERGENCY MEDICINE

## 2017-03-16 PROCEDURE — 99282 EMERGENCY DEPT VISIT SF MDM: CPT | Performed by: EMERGENCY MEDICINE

## 2017-03-16 PROCEDURE — 85025 COMPLETE CBC W/AUTO DIFF WBC: CPT | Performed by: EMERGENCY MEDICINE

## 2017-03-17 ENCOUNTER — PATIENT OUTREACH (OUTPATIENT)
Dept: CASE MANAGEMENT | Age: 82
End: 2017-03-17

## 2017-03-17 NOTE — ED PROVIDER NOTES
HPI Comments: Patient is an 81 yo male with nose bleed. Denies trauma, states he just noticed his nose begin to bleed and was unable to stop it with pressure. States it bled for approximately 30 minutes and has resolved since that time. On exam bleeding controlled, patient well appearing, smiling, NAD. Denies any further complaints, not on blood thinner other than aspirin, patient very well appearing, in NAD. On ROS denies chest pain, no SOB, no abdominal pain, no fevers or chills, no headaches, no further complaints. States this same thing occurred 1 year prior and required cautery. Patient is a 80 y.o. male presenting with epistaxis. The history is provided by the patient and a relative (son in law). No  was used.    Epistaxis           Past Medical History:   Diagnosis Date    Arthritis     back and shoulders; gets injections in shoulders and back every 3 months    B12 deficiency 1/28/2016    Benign prostatic hypertrophy     Cancer ([address]     colon//// pt has neuropathy in feet and finder tips since chemo    Chronic pain     neuropathy feet    Depression     Edema 1/11/2016    Eye infection     bilateral eyes~treated with long term use Minocycline    GERD (gastroesophageal reflux disease)     controlled with med    Gout     Hypertension     controlled with meds    Nausea & vomiting     Neurological disorder     from chemotherapy    Osteomyelitis ([address] 1/11/2016    Other ill-defined conditions(799.89)     rt knee pain       Past Surgical History:   Procedure Laterality Date    ABDOMEN SURGERY PROC UNLISTED  2002    part of colon removed from colon cancer    ABDOMEN SURGERY PROC UNLISTED      corazon ing hernia repair    HX CATARACT REMOVAL Bilateral     HX HEENT Right 03/01/2017    Excisional bx of R neck mass- Cesar Simpson    HX KNEE ARTHROSCOPY  2009    left    HX KNEE ARTHROSCOPY  2010    right knee    HX KNEE REPLACEMENT  2009    right knee    HX UROLOGICAL  1982 kidney stone removal         Family History:   Problem Relation Age of Onset    No Known Problems Mother     Malignant Hyperthermia Neg Hx     Pseudocholinesterase Deficiency Neg Hx     Delayed Awakening Neg Hx     Post-op Nausea/Vomiting Neg Hx     Emergence Delirium Neg Hx     Post-op Cognitive Dysfunction Neg Hx     Other Neg Hx        Social History     Social History    Marital status:      Spouse name: N/A    Number of children: N/A    Years of education: N/A     Occupational History    Not on file. 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 Worked as a , prior to admission was independent with ADL, IADL, and still driving as per son -in-law, no tobacco no alcohol. ALLERGIES: Codeine    Review of Systems   Constitutional: Negative for chills and fever. HENT: Positive for nosebleeds. Negative for rhinorrhea and sore throat. Eyes: Negative for visual disturbance. Respiratory: Negative for cough and shortness of breath. Cardiovascular: Negative for chest pain and leg swelling. Gastrointestinal: Negative for abdominal pain, diarrhea, nausea and vomiting. Genitourinary: Negative for dysuria. Musculoskeletal: Negative for back pain and neck pain. Skin: Negative for rash. Neurological: Negative for weakness and headaches. Psychiatric/Behavioral: The patient is not nervous/anxious. Vitals:    03/16/17 1833   BP: 131/56   Pulse: 78   Resp: 16   Temp: 98.2 °F (36.8 °C)   SpO2: 95%   Weight: 77.1 kg (170 lb)   Height: 5' 8\" (1.727 m)            Physical Exam   Constitutional: He is oriented to person, place, and time.  He appears well-developed and well-nourished. HENT:   Head: Normocephalic. Right Ear: External ear normal.   Left Ear: External ear normal.   Nose normal, clotted blood in right nare without active bleeding. Left nare normal.  No active bleeding in posterior oropharynx. Mild clotted blood on tongue, otherwise normal exam.   Eyes: Conjunctivae and EOM are normal. Pupils are equal, round, and reactive to light. Neck: Normal range of motion. Neck supple. No tracheal deviation present. Cardiovascular: Normal rate, regular rhythm, normal heart sounds and intact distal pulses. No murmur heard. Pulmonary/Chest: Effort normal and breath sounds normal. No respiratory distress. Abdominal: Soft. There is no tenderness. Musculoskeletal: Normal range of motion. Neurological: He is alert and oriented to person, place, and time. No cranial nerve deficit. Skin: No rash noted. Nursing note and vitals reviewed.        MDM  Number of Diagnoses or Management Options  Epistaxis: new and requires workup     Amount and/or Complexity of Data Reviewed  Clinical lab tests: ordered and reviewed  Review and summarize past medical records: yes    Risk of Complications, Morbidity, and/or Mortality  Presenting problems: moderate  Diagnostic procedures: moderate  Management options: moderate    Patient Progress  Patient progress: stable    ED Course       Procedures  Recent Results (from the past 12 hour(s))   CBC WITH AUTOMATED DIFF    Collection Time: 03/16/17  6:50 PM   Result Value Ref Range    WBC 6.9 4.3 - 11.1 K/uL    RBC 3.49 (L) 4.23 - 5.67 M/uL    HGB 11.8 (L) 13.6 - 17.2 g/dL    HCT 37.1 (L) 41.1 - 50.3 %    .3 (H) 79.6 - 97.8 FL    MCH 33.8 (H) 26.1 - 32.9 PG    MCHC 31.8 31.4 - 35.0 g/dL    RDW 14.7 (H) 11.9 - 14.6 %    PLATELET 587 576 - 568 K/uL    MPV 9.3 (L) 10.8 - 14.1 FL    DF AUTOMATED      NEUTROPHILS 63 43 - 78 %    LYMPHOCYTES 22 13 - 44 %    MONOCYTES 6 4.0 - 12.0 %    EOSINOPHILS 8 (H) 0.5 - 7.8 % BASOPHILS 1 0.0 - 2.0 %    IMMATURE GRANULOCYTES 0.3 0.0 - 5.0 %    ABS. NEUTROPHILS 4.3 1.7 - 8.2 K/UL    ABS. LYMPHOCYTES 1.5 0.5 - 4.6 K/UL    ABS. MONOCYTES 0.4 0.1 - 1.3 K/UL    ABS. EOSINOPHILS 0.5 0.0 - 0.8 K/UL    ABS. BASOPHILS 0.1 0.0 - 0.2 K/UL    ABS. IMM. GRANS. 0.0 0.0 - 0.5 K/UL   METABOLIC PANEL, BASIC    Collection Time: 03/16/17  6:50 PM   Result Value Ref Range    Sodium 139 136 - 145 mmol/L    Potassium 3.9 3.5 - 5.1 mmol/L    Chloride 102 98 - 107 mmol/L    CO2 29 21 - 32 mmol/L    Anion gap 8 7 - 16 mmol/L    Glucose 120 (H) 65 - 100 mg/dL    BUN 18 8 - 23 MG/DL    Creatinine 1.19 0.8 - 1.5 MG/DL    GFR est AA >60 >60 ml/min/1.73m2    GFR est non-AA >60 >60 ml/min/1.73m2    Calcium 8.6 8.3 - 10.4 MG/DL   PROTHROMBIN TIME + INR    Collection Time: 03/16/17  6:50 PM   Result Value Ref Range    Prothrombin time 10.2 9.6 - 12.0 sec    INR 1.0 0.9 - 1.2     PTT    Collection Time: 03/16/17  6:50 PM   Result Value Ref Range    aPTT 25.2 (L) 25.3 - 32.9 SEC         79 yo male with nosebleed:    Bleeding controlled, well appearing, NAD. Discussed neosporin for moisturization of nasal passages and follow up with ENT. Discussed pressure if bleeding returns and return if bleeding persists for 15 minutes or longer or any weakness or dizziness, nausea or vomiting, cough, chest pain, SOB or any further concerns.

## 2017-03-17 NOTE — DISCHARGE INSTRUCTIONS
Nosebleeds: Care Instructions  Your Care Instructions    Nosebleeds are common, especially if you have colds or allergies. Many things can cause a nosebleed. Some nosebleeds stop on their own with pressure. Others need packing. Some get cauterized (sealed). If you have gauze or other packing materials in your nose, you will need to follow up with your doctor to have the packing removed. You may need more treatment if you get nosebleeds a lot. The doctor has checked you carefully, but problems can develop later. If you notice any problems or new symptoms, get medical treatment right away. Follow-up care is a key part of your treatment and safety. Be sure to make and go to all appointments, and call your doctor if you are having problems. It's also a good idea to know your test results and keep a list of the medicines you take. How can you care for yourself at home? · If you get another nosebleed:  ¨ Sit up and tilt your head slightly forward. This keeps blood from going down your throat. ¨ Use your thumb and index finger to pinch your nose shut for 10 minutes. Use a clock. Do not check to see if the bleeding has stopped before the 10 minutes are up. If the bleeding has not stopped, pinch your nose shut for another 10 minutes. ¨ When the bleeding has stopped, try not to pick, rub, or blow your nose for 12 hours. Avoiding these things helps keep your nose from bleeding again. · If your doctor prescribed antibiotics, take them as directed. Do not stop taking them just because you feel better. You need to take the full course of antibiotics. To prevent nosebleeds  · Do not blow your nose too hard. · Try not to lift or strain after a nosebleed. · Raise your head on a pillow while you sleep. · Put a thin layer of a saline- or water-based nasal gel, such as NasoGel, inside your nose. Put it on the septum, which divides your nostrils. This will prevent dryness that can cause nosebleeds.   · Use a vaporizer or humidifier to add moisture to your bedroom. Follow the directions for cleaning the machine. · Do not use aspirin, ibuprofen (Advil, Motrin), or naproxen (Aleve) for 36 to 48 hours after a nosebleed unless your doctor tells you to. You can use acetaminophen (Tylenol) for pain relief. · Talk to your doctor about stopping any other medicines you are taking. Some medicines may make you more likely to get a nosebleed. · Do not use cold medicines or nasal sprays without first talking to your doctor. They can make your nose dry. When should you call for help? Call 911 anytime you think you may need emergency care. For example, call if:  · You passed out (lost consciousness). Call your doctor now or seek immediate medical care if:  · You get another nosebleed and your nose is still bleeding after you have applied pressure 3 times for 10 minutes each time (30 minutes total). · There is a lot of blood running down the back of your throat even after you pinch your nose and tilt your head forward. · You have a fever. · You have sinus pain. Watch closely for changes in your health, and be sure to contact your doctor if:  · You get nosebleeds often, even if they stop. · You do not get better as expected. Where can you learn more? Go to http://taylor-geoff.info/. Enter S156 in the search box to learn more about \"Nosebleeds: Care Instructions. \"  Current as of: May 27, 2016  Content Version: 11.1  © 7315-5080 [employer]. Care instructions adapted under license by [employer] (which disclaims liability or warranty for this information). If you have questions about a medical condition or this instruction, always ask your healthcare professional. [address] any warranty or liability for your use of this information.

## 2017-03-17 NOTE — ED NOTES
I have reviewed discharge information with patient. Patient verbalizes understanding. Patient non-ambulatory out of ER with wheelchair and family member. No distress noted.

## 2017-03-19 NOTE — PROGRESS NOTES
This note will not be viewable in [address] ED Transition of Care Discharge Follow-up Questionnaire   Date/Time of Call:   3/17/2017 3:30 pm     What was the patient seen in the ED for? Patient was in ED for diagnosis of:  Epistaxis     Does the patient understand his/her diagnosis and/or treatment and what happened during the ED visit? Patient voiced understanding of diagnosis and /or treatment. Did the patient receive discharge instructions from the ED? Yes. Patient states discharge instructions explained and received before discharge to home. Review any discharge instructions (see notes in [facility]). Ask patient if they understand these. Do they have any questions? Care Coordinator and patient reviewed discharge instructions per [facility]. Opportunity for questions and clarification provided. Patient verbalized understanding of instructions. Were home services ordered (nursing, PT, OT, ST, etc.)? No home services were ordered. If so, has the first visit occurred? If not, why? (Assist with coordination of services if necessary.)   n/a     Was any DME ordered? No DME ordered     If so, has it been received? If not, why?  (Assist with coordination of arranging DME orders if necessary.)   n/a     Complete a review of all medications (new, continued and discontinued meds per the D/C instructions and medication tab in [facility]). Care Coordinator and patient reviewed medications per [facility]. Were all new prescriptions filled? If not, why?  (Assist with obtainment of medications if necessary.)   No new medications were ordered by ED Care Physician. Does the patient understand the purpose and dosing instructions for all medications? (If patient has questions, provide explanation and education.)   Patient voiced understanding of purpose and dosing instructions for all medications.          Does the patient have any problems in performing ADLs? (If patient is unable to perform ADLs  what is the limiting factor(s)? Do they have a support system that can assist? If no support system is present, discuss possible assistance that they may be able to obtain.)       Patient reports being independent and performing ADLs at a slower pace. Patients spouse and family members are available to help if/when needed. Patient declines RN case management at this time. Does the patient have all follow-up appointments scheduled? Has transportation been arranged? [facility] follow-up should be within 7 days of discharge; all others should have PCP follow-up within 7   Days of discharge; follow-ups with other specialists as appropriate or ordered.)      Patient reports being scheduled for ED follow up with PCP on 3/23/2017. Patient encouraged to scheduled appointment with Dr. Bernardo sEparza ([phone]) as recommended by ED care Provider. Patient has transportation available. Any other questions or concerns expressed by the patient? Patient had no other questions or concerns and was appreciative of call. No other needs identified.          MINERVA Call Completed By:   Rose Zafar, [employer] Coordinator

## 2017-03-24 ENCOUNTER — HOSPITAL ENCOUNTER (OUTPATIENT)
Dept: RADIATION ONCOLOGY | Age: 82
Discharge: HOME | End: 2017-03-24
Payer: MEDICARE

## 2017-03-24 VITALS
DIASTOLIC BLOOD PRESSURE: 61 MMHG | WEIGHT: 172 LBS | BODY MASS INDEX: 26.15 KG/M2 | HEART RATE: 66 BPM | SYSTOLIC BLOOD PRESSURE: 156 MMHG | OXYGEN SATURATION: 95 % | TEMPERATURE: 98.6 F | RESPIRATION RATE: 18 BRPM

## 2017-03-24 DIAGNOSIS — C76.0 HEAD AND NECK CANCER (HCC): Primary | ICD-10-CM

## 2017-03-24 PROCEDURE — 99211 OFF/OP EST MAY X REQ PHY/QHP: CPT

## 2017-03-24 RX ORDER — MINOCYCLINE HYDROCHLORIDE 100 MG/1
CAPSULE ORAL
COMMUNITY
End: 2017-06-28 | Stop reason: SDUPTHER

## 2017-03-24 RX ORDER — DOCUSATE SODIUM 100 MG/1
100 CAPSULE, LIQUID FILLED ORAL 2 TIMES DAILY
COMMUNITY

## 2017-03-24 NOTE — PROGRESS NOTES
Pt here today for initial RT consult for right neck SCC with Dr. Lexi Wilder. Pt is accompanied by his daughter and is very hard of hearing--he could not hear the conversation. A brief overview of RT was given. Pt is c/o soreness of his right neck and behind his right ear post surgery 3 weeks ago. Pt will have a PET scan and return in 2 weeks for FUP and treatment planning. RT consents not signed today.

## 2017-03-24 NOTE — CONSULTS
Patient: Charisse Moffett MRN: [id number]  SSN: [id number]    YOB: 1928  Age: 80 y.o. 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 male who I am seeing at the request of Dr. Maryam Gregory. He was seen originally in Feb 2017 for an enlarging right neck mass. He also noted a near 35 lbs weight loss over th elast 6 months. He was referred originally for FNA which was completed by Dr. Diandra Jhaveri but this showed only some atypical squamous cell which were felt suspicious for malignancy. He saw Dr. Maryam Gregory who described a 2 cm firm but mobile right level 5 neck mass posterior ot the sternocleidomastoid muscle. CT showed only this single node. Dr. Maryam Gregory completed biopsy of the neck mass which returned positive for [facility] but he felt a neck dissection would be too morbid for him at his age and therefore did not further evaluate the neck. He also did speculative biopsies which were negative. He was seen 1 week following surgery and Dr. Maryam Gregory reviewed his apth and referred him to radiation and medical oncology. He felt a PET would be needed but wished to wait a few weeks to avoid the acute inflammation from surgery affecting the outcome but did order a CT neck which has yet to be completed. He is accompanied by a daughter and is very hard of hearing. He is oriented but appears to have a poor understanding of his current situation which is likely his baseline.          PAST MEDICAL HISTORY:    Past Medical History:   Diagnosis Date    Arthritis     back and shoulders; gets injections in shoulders and back every 3 months    B12 deficiency 1/28/2016    Benign prostatic hypertrophy     [facility]     colon//// pt has neuropathy in feet and finder tips since chemo    Chronic pain     neuropathy feet    Depression     Edema 1/11/2016    Eye infection     bilateral eyes~treated with long term use Minocycline    GERD (gastroesophageal reflux disease)     controlled with med    Gout     Hypertension     controlled with meds    Nausea & vomiting     Neurological disorder     from chemotherapy    Osteomyel[facility] ([facility] 1/11/2016    Other ill-defined conditions(799.89)     rt knee pain       The patient denies history of collagen vascular diseases, pacemaker insertion, prior radiation or prior chemotherapy. PAST SURGICAL HISTORY:   Past Surgical History:   Procedure Laterality Date    ABDOMEN SURGERY PROC UNLISTED  2002    part of colon removed from colon cancer    ABDOMEN SURGERY PROC UNLISTED      corazon ing hernia repair    HX CATARACT REMOVAL Bilateral     HX HEENT Right 03/01/2017    Excisional bx of R neck mass- Levada Passer HX KNEE ARTHROSCOPY  2009    left    HX KNEE ARTHROSCOPY  2010    right knee    HX KNEE REPLACEMENT  2009    right knee    HX UROLOGICAL  1982    kidney stone removal       MEDICATIONS:     Current Outpatient Prescriptions:     docusate sodium (COLACE) 100 mg capsule, Take 100 mg by mouth two (2) times a day., Disp: , Rfl:     minocycline (MINOCIN, DYNACIN) 100 mg capsule, Take  by mouth., Disp: , Rfl:     amLODIPine (NORVASC) 5 mg tablet, Take 1 Tab by mouth daily. , Disp: 90 Tab, Rfl: 3    OTHER, daily. REnavite tablet, Disp: , Rfl:     ondansetron (ZOFRAN ODT) 8 mg disintegrating tablet, Take 1 Tab by mouth every eight (8) hours as needed for Nausea., Disp: 8 Tab, Rfl: 0    HYDROcodone-acetaminophen (NORCO) 5-325 mg per tablet, 1-2 tabs every 4-6 hrs prn pain, Disp: 30 Tab, Rfl: 0    acetaminophen (TYLENOL) 325 mg tablet, Take 325 mg by mouth every four (4) hours as needed for Pain.  Indications: Arthritic Pain, Disp: , Rfl:     HYDROcodone-acetaminophen (NORCO)  mg tablet, Take 1 Tab by mouth nightly. Max Daily Amount: 1 Tab., Disp: 30 Tab, Rfl: 0    allopurinol (ZYLOPRIM) 100 mg tablet, Take 2 Tabs by mouth daily. (Patient taking differently: Take 200 mg by mouth daily. Take / use AM day of surgery  per anesthesia protocols. Indications: GOUT), Disp: 60 Tab, Rfl: 5    ondansetron (ZOFRAN ODT) 4 mg disintegrating tablet, Take 1 Tab by mouth every eight (8) hours as needed for Nausea., Disp: 90 Tab, Rfl: 1    pantoprazole (PROTONIX) 20 mg tablet, Take 1 Tab by mouth daily. (Patient taking differently: Take 20 mg by mouth daily. Take / use AM day of surgery  per anesthesia protocols. Indications: GASTROESOPHAGEAL REFLUX), Disp: 90 Tab, Rfl: 3    tamsulosin (FLOMAX) 0.4 mg capsule, Take 1 Cap by mouth daily. (Patient taking differently: Take 0.4 mg by mouth nightly. Indications: SYMPTOMATIC BENIGN PROSTATIC HYPERPLASIA), Disp: 90 Cap, Rfl: 3    furosemide (LASIX) 20 mg tablet, Take by mouth daily as directed in the morning (Patient taking differently: Take 20 mg by mouth daily. Take by mouth daily as directed in the morning  Indications: Edema), Disp: 90 Tab, Rfl: 11    magnesium oxide (MAG-OX) 400 mg tablet, Take 1 Tab by mouth daily. , Disp: 90 Tab, Rfl: 2    carvedilol (COREG) 12.5 mg tablet, Take by mouth 1 tablet in AM and 2 tablets in PM (Patient taking differently: Take 6.25 mg by mouth two (2) times a day. Take by mouth 1 tablet in AM and 1 tab in PM  Take / use AM day of surgery  per anesthesia protocols. Indications: hypertension), Disp: 270 Tab, Rfl: 3    DULoxetine (CYMBALTA) 60 mg capsule, Take 1 Cap by mouth daily. (Patient taking differently: Take 60 mg by mouth daily. Take / use AM day of surgery  per anesthesia protocols. Indications: ANXIETY WITH DEPRESSION), Disp: 90 Cap, Rfl: 3    aspirin delayed-release 81 mg tablet, Take 81 mg by mouth daily. Take / use AM day of surgery  per anesthesia protocols.   Indications: OSTEOARTHRITIS, Disp: , Rfl:     ALLERGIES: Allergies   Allergen Reactions    Codeine Nausea Only       SOCIAL HISTORY:   Social History     Social History    Marital status:      Spouse name: N/A    Number of children: N/A    Years of education: N/A     Occupational History    Not on file. 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 Worked as a , prior to admission was independent with ADL, IADL, and still driving as per son -in-law, no tobacco no alcohol. FAMILY HISTORY:   Family History   Problem Relation Age of Onset    No Known Problems Mother     Malignant Hyperthermia Neg Hx     Pseudocholinesterase Deficiency Neg Hx     Delayed Awakening Neg Hx     Post-op Nausea/Vomiting Neg Hx     Emergence Delirium Neg Hx     Post-op Cognitive Dysfunction Neg Hx     Other Neg Hx        REVIEW OF SYSTEMS: Please see the completed review of systems sheet in the chart that I have reviewed today. PHYSICAL EXAMINATION:   ECOG Performance status 2  VITAL SIGNS:   Visit Vitals    /61    Pulse 66    Temp 98.6 °F (37 °C)    Resp 18    Wt 78 kg (172 lb)    SpO2 95%    BMI 26.15 kg/m2        GENERAL: The patient is well-developed, ambulatory, alert and in no acute distress. HEENT: Head is normocephalic, atraumatic. Pupils are equal, round and reactive to light and accommodation. Extraocular movement intact. Hearing is impaired bilaterally but is able to hear with loud clear voice. Oral cavity reveals no lesions. Mucous membranes are moist. NECK: Neck is supple with no masses. Well healed right neck incision. CARDIOVASCULAR: Heart is regular rate and rhythm.  There are no murmurs rubs or gallups. Radial pulses are 2+ RESPIRATORY: Lungs are clear to auscultation and percussion. There is normal respiratory effort. GASTROINTESTINAL: The abdomen is soft, non-tender, nondistended with no hepatospelnomagaly. Digital rectal examination: deferred LYMPHATIC: There is no cervical, supraclavicular or axillary lymphadenopathy bilaterally. MUSCULOSKELETAL: Extremities reveal no cyanosis, clubbing or edema.  is 5+/5. SKIN:  Several skin cancers peripherally although no appreciable area in the region of the right neck. NEURO:  Cranial nerves II-XII grossly intact. Muscular strength and sensation are intact throughout all four extremities. PATHOLOGY:    3/1/17:  DIAGNOSIS   A: RIGHT NECK MASS: SQUAMOUS CELL CARCINOMA. B: RIGHT NASOPHARYNX BIOPSY: BENIGN UPPER RESPIRATORY MUCOSA AND CARTILAGE. SEE MICROSCOPIC DESCRIPTION. C: RIGHT PIRIFORM SINUS: BENIGN SQUAMOUS MUCOSAL TISSUE. D: RIGHT BASE OF TONGUE: BENIGN SQUAMOUS MUCOSAL TISSUE WITH ASSOCIATED LYMPHOID TISSUE. E: RIGHT TONSIL BIOPSY: BENIGN SQUAMOUS MUCOSAL TISSUE. DIAGNOSIS   (R) Neck Mass, Fine Needle Aspiration:   SQUAMOUS CELL CARCINOMA   Cell block: Squamous cell carcinoma and lymphoid tissue. See concurrent case [id number].      LABORATORY:   Lab Results   Component Value Date/Time    Sodium 139 03/16/2017 06:50 PM    Potassium 3.9 03/16/2017 06:50 PM    Chloride 102 03/16/2017 06:50 PM    CO2 29 03/16/2017 06:50 PM    Anion gap 8 03/16/2017 06:50 PM    Glucose 120 03/16/2017 06:50 PM    BUN 18 03/16/2017 06:50 PM    Creatinine 1.19 03/16/2017 06:50 PM    GFR est AA >60 03/16/2017 06:50 PM    GFR est non-AA >60 03/16/2017 06:50 PM    Calcium 8.6 03/16/2017 06:50 PM    Magnesium 1.8 03/01/2016 12:45 PM    Albumin 3.2 03/09/2017 03:51 PM    Protein, total 5.6 03/09/2017 03:51 PM    Globulin 3.1 03/03/2016 05:35 AM    A-G Ratio 1.3 03/09/2017 03:51 PM    AST (SGOT) 21 03/09/2017 03:51 PM    ALT (SGPT) 12 03/09/2017 03:51 PM     Lab Results   Component Value Date/Time    WBC 6.9 03/16/2017 06:50 PM    HGB 11.8 03/16/2017 06:50 PM    HCT 37.1 03/16/2017 06:50 PM    PLATELET 635 01/91/1816 06:50 PM       RADIOLOGY:    Outside CT showed single right 2 cm level 5 lymph node. There was no suggestion of a mucosal primary or other neck disease. IMPRESSION:  Dellis Oppenheim is a 80 y.o. male with what appears to be an unknown primary cancer with a single node positive in the right neck. We discussed unknown primary disease and the likely association with a head and neck unknown primary. We discussed the workup including speculative biopsies which were negative, potentially tonsillectomy, and the need for a PET/CT scan to fully stage him and determine if it truly represents an unknown primary. I would wait perhaps another week and then complete the PET. I have also contacted pathology and asked for HPV to be run on the specimen. This will help determine the likely location as HPV tumors are most commonly seen in the oropharynx. The location of the lymph node is uncommon in terms of head and neck primaries which would raise the concern for either larynx or hypopharynx cancers, or more uncommonly SCC of the skin in a nearby location although there is no known lesion or prior skin cancer removal history. I would therefore plan to see him back in 2 weeks after the PET in order to fully stage him and be able to create a treatment plan. If the remaining workup is negative, we could just watch him closely considering the primary is not always determined and tecnhically extrapolating from mucosal head and neck literature, with only a single N1 node less than 3 cm, there is not an indication for adjuvant radiation. However, this is normally based on patients who are known N1 after a neck dissection so this may be more risky for him.          PLAN:    1) Discussed treatment with external beam radiation reviewing risk, benefits, and side effects from treatment. 2) Reviewed available research treatment and cancer care protocols for which patient may be eligible. Unfortunately there are no matching clinical trials available at this time. 3) Coordinate care and start date with medical oncology and Dr. Gab Johnson in ENT  4) Follow up in 2 weeks with PET/CT and HPV testing.         Latonya Francois MD   March 24, 2017

## 2017-03-30 ENCOUNTER — HOSPITAL ENCOUNTER (OUTPATIENT)
Dept: PET IMAGING | Age: 82
Discharge: HOME | End: 2017-03-30
Payer: MEDICARE

## 2017-03-30 DIAGNOSIS — C76.0 HEAD AND NECK CANCER (HCC): ICD-10-CM

## 2017-03-30 PROCEDURE — 74011636320 HC RX REV CODE- 636/320: Performed by: RADIOLOGY

## 2017-03-30 PROCEDURE — A9552 F18 FDG: HCPCS

## 2017-03-30 RX ADMIN — DIATRIZOATE MEGLUMINE AND DIATRIZOATE SODIUM 10 ML: 660; 100 LIQUID ORAL; RECTAL at 13:55

## 2017-04-06 ENCOUNTER — HOSPITAL ENCOUNTER (OUTPATIENT)
Dept: RADIATION ONCOLOGY | Age: 82
Discharge: HOME | End: 2017-04-06
Payer: MEDICARE

## 2017-04-06 DIAGNOSIS — C44.42 SQUAMOUS CELL CANCER OF SCALP AND SKIN OF NECK: Primary | ICD-10-CM

## 2017-04-17 DIAGNOSIS — F41.9 ANXIETY: Primary | ICD-10-CM

## 2017-04-17 RX ORDER — LORAZEPAM 1 MG/1
1 TABLET ORAL ONCE
Qty: 3 TAB | Refills: 0 | Status: SHIPPED | OUTPATIENT
Start: 2017-04-17 | End: 2017-04-17

## 2017-04-19 ENCOUNTER — HOSPITAL ENCOUNTER (OUTPATIENT)
Dept: MRI IMAGING | Age: 82
Discharge: HOME | End: 2017-04-19
Attending: RADIOLOGY
Payer: MEDICARE

## 2017-04-19 LAB — CREAT BLD-MCNC: 1.2 MG/DL (ref 0.6–1)

## 2017-04-19 PROCEDURE — 70543 MRI ORBT/FAC/NCK W/O &W/DYE: CPT

## 2017-04-19 PROCEDURE — A9577 INJ MULTIHANCE: HCPCS | Performed by: RADIOLOGY

## 2017-04-19 PROCEDURE — 82565 ASSAY OF CREATININE: CPT

## 2017-04-19 PROCEDURE — 74011250636 HC RX REV CODE- 250/636: Performed by: RADIOLOGY

## 2017-04-19 RX ORDER — SODIUM CHLORIDE 0.9 % (FLUSH) 0.9 %
10 SYRINGE (ML) INJECTION
Status: COMPLETED | OUTPATIENT
Start: 2017-04-19 | End: 2017-04-19

## 2017-04-19 RX ADMIN — Medication 10 ML: at 14:49

## 2017-04-19 RX ADMIN — GADOBENATE DIMEGLUMINE 8 ML: 529 INJECTION, SOLUTION INTRAVENOUS at 14:49

## 2017-04-20 ENCOUNTER — HOSPITAL ENCOUNTER (OUTPATIENT)
Dept: RADIATION ONCOLOGY | Age: 82
Discharge: HOME | End: 2017-04-20
Payer: MEDICARE

## 2017-04-20 VITALS
WEIGHT: 179 LBS | BODY MASS INDEX: 27.22 KG/M2 | DIASTOLIC BLOOD PRESSURE: 56 MMHG | RESPIRATION RATE: 18 BRPM | HEART RATE: 80 BPM | SYSTOLIC BLOOD PRESSURE: 126 MMHG | OXYGEN SATURATION: 96 % | TEMPERATURE: 98.2 F

## 2017-04-20 PROCEDURE — 99211 OFF/OP EST MAY X REQ PHY/QHP: CPT

## 2017-04-20 NOTE — PROGRESS NOTES
Pt here today to discuss the MRI Face results, he is accompanied by his family, and he now has hearing aids so he is able to hear the conversation. 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 Pt denies pain or bleeding. He will have RT at [facility] signed and records scan/email to Sandra/Rodolfo.

## 2017-04-20 NOTE — PROGRESS NOTES
Patient: Riya Lackey MRN: [id number]  SSN: [id number]    YOB: 1928  Age: 80 y.o. 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 Presumably [id number] Nasopharynx SCC. PREVIOUS TREATMENT:  1) 3/1/17: Incisional Bx of right neck mass and direct laryngoscopy w/ speculative biopsies    HISTORY OF PRESENT ILLNESS:  Riya Lackey is a 80 y.o. male who I am seeing at the request of Dr. Luellen Hodgkin back in follow up after consultation 3/24/17. He was seen originally in Feb 2017 for an enlarging right neck mass. He also noted a near 35 lbs weight loss over th elast 6 months. He was referred originally for FNA which was completed by Dr. Pipo Estrada but this showed only some atypical squamous cell which were felt suspicious for malignancy. He saw Dr. Luellen Hodgkin who described a 2 cm firm but mobile right level 5 neck mass posterior ot the sternocleidomastoid muscle. CT showed only this single node. Dr. Luellen Hodgkin completed biopsy of the neck mass which returned positive for [facility] but he felt a neck dissection would be too morbid for him at his age and therefore did not further evaluate the neck. He also did speculative biopsies which were negative. He was seen 1 week following surgery and Dr. Luellen Hodgkin reviewed his apth and referred him to radiation and medical oncology. He felt a PET would be needed but wished to wait a few weeks to avoid the acute inflammation from surgery affecting the outcome but did order a CT neck which was not completed when he missed the appointment. He is accompanied by a daughter and is very hard of hearing. He is oriented but appears to have a poor understanding of his current situation which is likely his baseline.   I saw him and ordered p16 on the tissue which was diffusely positive. We also completed a PET/CT and when disease was noted in the right nasopharynx, ordered MRI. He is back today to discuss these findings and make final treatment recommendations. PAST MEDICAL HISTORY:    Past Medical History:   Diagnosis Date    Arthritis     back and shoulders; gets injections in shoulders and back every 3 months    B12 deficiency 1/28/2016    Benign prostatic hypertrophy     Cancer ([address]     colon//// pt has neuropathy in feet and finder tips since chemo    Chronic pain     neuropathy feet    Depression     Edema 1/11/2016    Eye infection     bilateral eyes~treated with long term use Minocycline    GERD (gastroesophageal reflux disease)     controlled with med    Gout     Hypertension     controlled with meds    Nausea & vomiting     Neurological disorder     from chemotherapy    Osteomyelitis ([address] 1/11/2016    Other ill-defined conditions     rt knee pain       The patient denies history of collagen vascular diseases, pacemaker insertion, prior radiation or prior chemotherapy. PAST SURGICAL HISTORY:   Past Surgical History:   Procedure Laterality Date    ABDOMEN SURGERY PROC UNLISTED  2002    part of colon removed from colon cancer    ABDOMEN SURGERY PROC UNLISTED      corazon ing hernia repair    HX CATARACT REMOVAL Bilateral     HX HEENT Right 03/01/2017    Excisional bx of R neck mass- Bob Nearing HX KNEE ARTHROSCOPY  2009    left    HX KNEE ARTHROSCOPY  2010    right knee    HX KNEE REPLACEMENT  2009    right knee    HX UROLOGICAL  1982    kidney stone removal       MEDICATIONS:     Current Outpatient Prescriptions:     docusate sodium (COLACE) 100 mg capsule, Take 100 mg by mouth two (2) times a day., Disp: , Rfl:     minocycline (MINOCIN, DYNACIN) 100 mg capsule, Take  by mouth., Disp: , Rfl:     amLODIPine (NORVASC) 5 mg tablet, Take 1 Tab by mouth daily. , Disp: 90 Tab, Rfl: 3    OTHER, daily. REnavite tablet, Disp: , Rfl:     ondansetron (ZOFRAN ODT) 8 mg disintegrating tablet, Take 1 Tab by mouth every eight (8) hours as needed for Nausea., Disp: 8 Tab, Rfl: 0    HYDROcodone-acetaminophen (NORCO) 5-325 mg per tablet, 1-2 tabs every 4-6 hrs prn pain, Disp: 30 Tab, Rfl: 0    acetaminophen (TYLENOL) 325 mg tablet, Take 325 mg by mouth every four (4) hours as needed for Pain. Indications: Arthritic Pain, Disp: , Rfl:     HYDROcodone-acetaminophen (NORCO)  mg tablet, Take 1 Tab by mouth nightly. Max Daily Amount: 1 Tab., Disp: 30 Tab, Rfl: 0    allopurinol (ZYLOPRIM) 100 mg tablet, Take 2 Tabs by mouth daily. (Patient taking differently: Take 200 mg by mouth daily. Take / use AM day of surgery  per anesthesia protocols. Indications: GOUT), Disp: 60 Tab, Rfl: 5    ondansetron (ZOFRAN ODT) 4 mg disintegrating tablet, Take 1 Tab by mouth every eight (8) hours as needed for Nausea., Disp: 90 Tab, Rfl: 1    pantoprazole (PROTONIX) 20 mg tablet, Take 1 Tab by mouth daily. (Patient taking differently: Take 20 mg by mouth daily. Take / use AM day of surgery  per anesthesia protocols. Indications: GASTROESOPHAGEAL REFLUX), Disp: 90 Tab, Rfl: 3    tamsulosin (FLOMAX) 0.4 mg capsule, Take 1 Cap by mouth daily. (Patient taking differently: Take 0.4 mg by mouth nightly. Indications: SYMPTOMATIC BENIGN PROSTATIC HYPERPLASIA), Disp: 90 Cap, Rfl: 3    furosemide (LASIX) 20 mg tablet, Take by mouth daily as directed in the morning (Patient taking differently: Take 20 mg by mouth daily. Take by mouth daily as directed in the morning  Indications: Edema), Disp: 90 Tab, Rfl: 11    magnesium oxide (MAG-OX) 400 mg tablet, Take 1 Tab by mouth daily. , Disp: 90 Tab, Rfl: 2    carvedilol (COREG) 12.5 mg tablet, Take by mouth 1 tablet in AM and 2 tablets in PM (Patient taking differently: Take 6.25 mg by mouth two (2) times a day.  Take by mouth 1 tablet in AM and 1 tab in PM  Take / use AM day of surgery  per anesthesia protocols. Indications: hypertension), Disp: 270 Tab, Rfl: 3    DULoxetine (CYMBALTA) 60 mg capsule, Take 1 Cap by mouth daily. (Patient taking differently: Take 60 mg by mouth daily. Take / use AM day of surgery  per anesthesia protocols. Indications: ANXIETY WITH DEPRESSION), Disp: 90 Cap, Rfl: 3    aspirin delayed-release 81 mg tablet, Take 81 mg by mouth daily. Take / use AM day of surgery  per anesthesia protocols. Indications: OSTEOARTHRITIS, Disp: , Rfl:   No current facility-administered medications for this encounter. ALLERGIES:   Allergies   Allergen Reactions    Codeine Nausea Only       SOCIAL HISTORY:   Social History     Social History    Marital status:      Spouse name: N/A    Number of children: N/A    Years of education: N/A     Occupational History    Not on file. 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 Worked as a , prior to admission was independent with ADL, IADL, and still driving as per son -in-law, no tobacco no alcohol. FAMILY HISTORY:   Family History   Problem Relation Age of Onset    No Known Problems Mother     Malignant Hyperthermia Neg Hx     Pseudocholinesterase Deficiency Neg Hx     Delayed Awakening Neg Hx     Post-op Nausea/Vomiting Neg Hx     Emergence Delirium Neg Hx     Post-op Cognitive Dysfunction Neg Hx     Other Neg Hx        REVIEW OF SYSTEMS: Please see the completed review of systems sheet in the chart that I have reviewed today.       PHYSICAL EXAMINATION:   ECOG Performance status 2  VITAL SIGNS:   Visit Vitals    /56    Pulse 80    Temp 98.2 °F (36.8 °C)    Resp 18  Wt 81.2 kg (179 lb)    SpO2 96%    BMI 27.22 kg/m2        GENERAL: The patient is well-developed, ambulatory, alert and in no acute distress. HEENT: Head is normocephalic, atraumatic. Pupils are equal, round and reactive to light and accommodation. Extraocular movement intact. Hearing is impaired bilaterally but is able to hear with loud clear voice. Oral cavity reveals no lesions. Mucous membranes are moist. NECK: Neck is supple with no masses. Well healed right neck incision. CARDIOVASCULAR: Heart is regular rate and rhythm. There are no murmurs rubs or gallups. Radial pulses are 2+ RESPIRATORY: Lungs are clear to auscultation and percussion. There is normal respiratory effort. GASTROINTESTINAL: The abdomen is soft, non-tender, nondistended with no hepatospelnomagaly. Digital rectal examination: deferred LYMPHATIC: There is no cervical, supraclavicular or axillary lymphadenopathy bilaterally. PATHOLOGY:    3/1/17:  DIAGNOSIS   A: RIGHT NECK MASS: SQUAMOUS CELL CARCINOMA. B: RIGHT NASOPHARYNX BIOPSY: BENIGN UPPER RESPIRATORY MUCOSA AND CARTILAGE. SEE MICROSCOPIC DESCRIPTION. C: RIGHT PIRIFORM SINUS: BENIGN SQUAMOUS MUCOSAL TISSUE. D: RIGHT BASE OF TONGUE: BENIGN SQUAMOUS MUCOSAL TISSUE WITH ASSOCIATED LYMPHOID TISSUE. E: RIGHT TONSIL BIOPSY: BENIGN SQUAMOUS MUCOSAL TISSUE. DIAGNOSIS   (R) Neck Mass, Fine Needle Aspiration:   SQUAMOUS CELL CARCINOMA   Cell block: Squamous cell carcinoma and lymphoid tissue. See concurrent case [id number]. FOLLOW UP REQUEST FOR P16 STATUS   Interpretation   Immunohistochemical Stain Panel: Block A2. Interpretation: Squamous cell carcinoma with diffuse p16 positivity.    Antibody/Test Marker For Result   p16 Overexpressed in many squamous carcinomas Diffusely positive     LABORATORY:   Lab Results   Component Value Date/Time    Sodium 139 03/16/2017 06:50 PM    Potassium 3.9 03/16/2017 06:50 PM    Chloride 102 03/16/2017 06:50 PM    CO2 29 03/16/2017 06:50 PM    Anion gap 8 03/16/2017 06:50 PM    Glucose 120 03/16/2017 06:50 PM    BUN 18 03/16/2017 06:50 PM    Creatinine 1.19 03/16/2017 06:50 PM    GFR est AA >60 03/16/2017 06:50 PM    GFR est non-AA >60 03/16/2017 06:50 PM    Calcium 8.6 03/16/2017 06:50 PM    Magnesium 1.8 03/01/2016 12:45 PM    Albumin 3.2 03/09/2017 03:51 PM    Protein, total 5.6 03/09/2017 03:51 PM    Globulin 3.1 03/03/2016 05:35 AM    A-G Ratio 1.3 03/09/2017 03:51 PM    AST (SGOT) 21 03/09/2017 03:51 PM    ALT (SGPT) 12 03/09/2017 03:51 PM     Lab Results   Component Value Date/Time    WBC 6.9 03/16/2017 06:50 PM    HGB 11.8 03/16/2017 06:50 PM    HCT 37.1 03/16/2017 06:50 PM    PLATELET 503 85/38/0670 06:50 PM       RADIOLOGY:    Outside CT showed single right 2 cm level 5 lymph node. There was no suggestion of a mucosal primary or other neck disease. I have personally reviewed the imaging and agree with the reports below. Pet/ct Tumor Image Skull Thigh W (ini)    Result Date: 3/30/2017  PET/CT  Indication: Squamous cell carcinoma right neck status post biopsy 03/01/2017. Initial staging. Remote history colon cancer treated with chemotherapy 2002. Radiopharmaceutical: 16.2 mCi F18-FDG, intravenously. Technique: Imaging was performed from the skull through the proximal thighs using routine PET/CT acquisition protocol. Imaging was performed approximately 60 minutes post injection. Oral contrast was administered. Radiation dose reduction techniques were used for this study:  Our CT scanners use one or all of the following: Automated exposure control, adjustment of the mA and/or kVp according to patient's size, iterative reconstruction. Serum glucose: 89 mg/dL prior to injection. Comparison studies: CT abdomen and pelvis 10/14/2016, chest CT 2/23/2009 Findings: Head and Neck: Partial visualization sequelae infarct right temporoparietal region.  Mild soft tissue stranding at the lateral right neck image 23 with minimal FDG activity. Of uncertain etiology. There is focal elevated FDG uptake with Max SUV 8.9 image 8 centered at the superior right aspect Waldeyer's ring at level of eustachian tube orifice. No adjacent adenopathy. Chest: No lymphadenopathy or abnormal FDG uptake. Extensive atherosclerotic calcification of the arterial system. Cardiac enlargement. Small hiatal hernia. Degenerative change at the shoulders with large joint effusion on the right. No discrete pulmonary mass on nonbreath-hold technique. There is however groundglass density superior segment right upper lobe image 75 with associated elevated FDG activity. Abdomen/Pelvis: No abnormal FDG uptake or lymphadenopathy. Unremarkable sequelae right hemicolectomy. No bowel obstruction. Cholelithiasis. No abnormal osseous uptake. IMPRESSION: 1. Prominent uptake nasopharyngeal area on the right which may represent primary site of neoplasm. No discrete adenopathy or distant metastatic disease. Mri Orb Face Neck W Wo Cont    Result Date: 4/19/2017  MRI SOFT TISSUE NECK WITHOUT AND WITH CONTRAST 4/19/2017 HISTORY: Squamous cell carcinoma of the neck diagnosed on excisional biopsy of level 5 lymph node. Abnormal hypermetabolic activity in the right nasopharynx on PET scan. This study was initiated 4/12/2017 and completed today. TECHNIQUE: Multiplanar multisequence MR imaging of the suprahyoid and infrahyoid neck was performed. 8 mL MultiHance gadolinium was used for the study. COMPARISON: PET/CT 3/30/2017 FINDINGS: There is an enhancing mass in the right nasopharynx measuring approximately 2.6 x 1.8 x 3.3 cm AP by transverse by CC consistent with a nasopharyngeal neoplasm. This corresponds to an area of abnormal activity on PET imaging. There is an associated right mastoid effusion, suggesting nasopharyngeal obstruction. Unfortunately, there is extensive motion artifact throughout the study despite repeat scanning attempts.  This most prevents a detailed characterization of this mass and limits evaluation for perineural spread. Included images of the brain demonstrate encephalomalacia throughout the right temporal lobe and right parietal lobe. Cerebral volume loss is present. Mucosal thickening is present in the bilateral maxillary sinuses. A large mucosal lesion is present in the left maxillary sinus. The left sphenoid sinus is opacified. There is no definite cervical adenopathy. IMPRESSION: 1. Nondiagnostic study because of extensive motion artifact, limiting evaluation of the nasopharyngeal mass. A contrast-enhanced neck CT may be beneficial for further evaluation. Alternatively, MR imaging could be performed with general anesthesia. 2. Right temporoparietal encephalomalacia. 3. 2.6 cm right-sided nasopharyngeal mass with right mastoid effusion. IMPRESSION:  Caleb Quiñonez is a 80 y.o. male with what appeared to be an unknown primary cancer with a single node positive in the right neck. We discussed unknown primary disease and the likely association with a head and neck unknown primary. We discussed the workup including speculative biopsies which were negative, potentially tonsillectomy, and the need for a PET/CT scan to fully stage him and determine if it truly represents an unknown primary. I have also contacted pathology and asked for HPV to be run on the specimen which was diffusely positive. The location of the lymph node is uncommon in terms of head and neck primaries which would raise the concern for either larynx or hypopharynx cancers, nasopharynx, or more uncommonly SCC of the skin in a nearby location although there is no known lesion or prior skin cancer removal history. We completed PET and there was in fact uptake in the right sided of the nasopharynx in the most common location for nasopharyngeal cancers.   However, these are commonly HPV negative so the story really is complicated but after the MRI, it appears to be unequivocally a nasopharynx cancer. I spoke with Dr. Miladys Gama and based on this evidence, felt comfortable avoiding the biopsy. I wished to refer him to medical oncology in case we feel concurrent therapy is warranted which would be necessary with node positive disease although I may wish to defer with his age and functional status. We had an appointment set up but he and his family canceled but are now agreeable. I outlined the most aggressive option of chemoRT vs palliative radiaiton or anything in between and will make this decision in a few weeks at [address] [place] [address] which is right next to [facility]. This will give him time to contemplate his options and see Dr. Luis Daniel Henderson. I feel we will most likely go on with 7 planned weeks of radiatoin alone knowing we could stop at any point if he wishes or is not tolerating well. PLAN:    1) Discussed treatment with external beam radiation reviewing risk, benefits, and side effects from treatment. 2) Reviewed available research treatment and cancer care protocols for which patient may be eligible. Unfortunately there are no matching clinical trials available at this time. 3) Coordinate care and start date with Dr. Miladys Gama in ENT and refer to medical oncology. 4) Follow up in 2 weeks to make final treatment decisions. Portions of this note were copied from prior encounters and reviewed for accuracy, currency, and represent documentation and tasks completed during this encounter. I verify and attest these portions to be unchanged from prior visits.     Alexander Michelle MD  04/20/17

## 2017-06-03 ENCOUNTER — APPOINTMENT (OUTPATIENT)
Dept: CT IMAGING | Age: 82
End: 2017-06-03
Attending: STUDENT IN AN ORGANIZED HEALTH CARE EDUCATION/TRAINING PROGRAM
Payer: MEDICARE

## 2017-06-03 ENCOUNTER — APPOINTMENT (OUTPATIENT)
Dept: GENERAL RADIOLOGY | Age: 82
End: 2017-06-03
Attending: STUDENT IN AN ORGANIZED HEALTH CARE EDUCATION/TRAINING PROGRAM
Payer: MEDICARE

## 2017-06-03 ENCOUNTER — HOSPITAL ENCOUNTER (EMERGENCY)
Age: 82
Discharge: HOME | End: 2017-06-04
Attending: STUDENT IN AN ORGANIZED HEALTH CARE EDUCATION/TRAINING PROGRAM
Payer: MEDICARE

## 2017-06-03 DIAGNOSIS — K59.00 CONSTIPATION, UNSPECIFIED CONSTIPATION TYPE: Primary | ICD-10-CM

## 2017-06-03 DIAGNOSIS — N20.0 KIDNEY STONE: ICD-10-CM

## 2017-06-03 LAB
ALBUMIN SERPL BCP-MCNC: 3.4 G/DL (ref 3.2–4.6)
ALBUMIN/GLOB SERPL: 0.8 {RATIO} (ref 1.2–3.5)
ALP SERPL-CCNC: 115 U/L (ref 50–136)
ALT SERPL-CCNC: 17 U/L (ref 12–65)
ANION GAP BLD CALC-SCNC: 12 MMOL/L (ref 7–16)
AST SERPL W P-5'-P-CCNC: 24 U/L (ref 15–37)
BASOPHILS # BLD AUTO: 0 K/UL (ref 0–0.2)
BASOPHILS # BLD: 0 % (ref 0–2)
BILIRUB SERPL-MCNC: 0.5 MG/DL (ref 0.2–1.1)
BUN SERPL-MCNC: 30 MG/DL (ref 8–23)
CALCIUM SERPL-MCNC: 9.3 MG/DL (ref 8.3–10.4)
CHLORIDE SERPL-SCNC: 100 MMOL/L (ref 98–107)
CO2 SERPL-SCNC: 30 MMOL/L (ref 21–32)
CREAT SERPL-MCNC: 1.5 MG/DL (ref 0.8–1.5)
DIFFERENTIAL METHOD BLD: ABNORMAL
EOSINOPHIL # BLD: 0.5 K/UL (ref 0–0.8)
EOSINOPHIL NFR BLD: 5 % (ref 0.5–7.8)
ERYTHROCYTE [DISTWIDTH] IN BLOOD BY AUTOMATED COUNT: 14.6 % (ref 11.9–14.6)
GLOBULIN SER CALC-MCNC: 4.1 G/DL (ref 2.3–3.5)
GLUCOSE SERPL-MCNC: 103 MG/DL (ref 65–100)
HCT VFR BLD AUTO: 38.7 % (ref 41.1–50.3)
HGB BLD-MCNC: 12.9 G/DL (ref 13.6–17.2)
IMM GRANULOCYTES # BLD: 0 K/UL (ref 0–0.5)
IMM GRANULOCYTES NFR BLD AUTO: 0.2 % (ref 0–5)
LIPASE SERPL-CCNC: 93 U/L (ref 73–393)
LYMPHOCYTES # BLD AUTO: 9 % (ref 13–44)
LYMPHOCYTES # BLD: 1 K/UL (ref 0.5–4.6)
MCH RBC QN AUTO: 34.2 PG (ref 26.1–32.9)
MCHC RBC AUTO-ENTMCNC: 33.3 G/DL (ref 31.4–35)
MCV RBC AUTO: 102.7 FL (ref 79.6–97.8)
MONOCYTES # BLD: 0.8 K/UL (ref 0.1–1.3)
MONOCYTES NFR BLD AUTO: 7 % (ref 4–12)
NEUTS SEG # BLD: 8.2 K/UL (ref 1.7–8.2)
NEUTS SEG NFR BLD AUTO: 79 % (ref 43–78)
PLATELET # BLD AUTO: 177 K/UL (ref 150–450)
PMV BLD AUTO: 9.4 FL (ref 10.8–14.1)
POTASSIUM SERPL-SCNC: 4.3 MMOL/L (ref 3.5–5.1)
PROT SERPL-MCNC: 7.5 G/DL (ref 6.3–8.2)
RBC # BLD AUTO: 3.77 M/UL (ref 4.23–5.67)
SODIUM SERPL-SCNC: 142 MMOL/L (ref 136–145)
WBC # BLD AUTO: 10.5 K/UL (ref 4.3–11.1)

## 2017-06-03 PROCEDURE — 96360 HYDRATION IV INFUSION INIT: CPT | Performed by: STUDENT IN AN ORGANIZED HEALTH CARE EDUCATION/TRAINING PROGRAM

## 2017-06-03 PROCEDURE — 74177 CT ABD & PELVIS W/CONTRAST: CPT

## 2017-06-03 PROCEDURE — 74011000258 HC RX REV CODE- 258: Performed by: STUDENT IN AN ORGANIZED HEALTH CARE EDUCATION/TRAINING PROGRAM

## 2017-06-03 PROCEDURE — 71010 XR CHEST PORT: CPT

## 2017-06-03 PROCEDURE — 80053 COMPREHEN METABOLIC PANEL: CPT | Performed by: EMERGENCY MEDICINE

## 2017-06-03 PROCEDURE — 85025 COMPLETE CBC W/AUTO DIFF WBC: CPT | Performed by: EMERGENCY MEDICINE

## 2017-06-03 PROCEDURE — 74011636320 HC RX REV CODE- 636/320: Performed by: STUDENT IN AN ORGANIZED HEALTH CARE EDUCATION/TRAINING PROGRAM

## 2017-06-03 PROCEDURE — 83690 ASSAY OF LIPASE: CPT | Performed by: STUDENT IN AN ORGANIZED HEALTH CARE EDUCATION/TRAINING PROGRAM

## 2017-06-03 PROCEDURE — 96361 HYDRATE IV INFUSION ADD-ON: CPT | Performed by: STUDENT IN AN ORGANIZED HEALTH CARE EDUCATION/TRAINING PROGRAM

## 2017-06-03 PROCEDURE — 99284 EMERGENCY DEPT VISIT MOD MDM: CPT | Performed by: STUDENT IN AN ORGANIZED HEALTH CARE EDUCATION/TRAINING PROGRAM

## 2017-06-03 PROCEDURE — 93005 ELECTROCARDIOGRAM TRACING: CPT | Performed by: STUDENT IN AN ORGANIZED HEALTH CARE EDUCATION/TRAINING PROGRAM

## 2017-06-03 RX ORDER — SODIUM CHLORIDE 0.9 % (FLUSH) 0.9 %
10 SYRINGE (ML) INJECTION
Status: COMPLETED | OUTPATIENT
Start: 2017-06-03 | End: 2017-06-03

## 2017-06-03 RX ADMIN — Medication 10 ML: at 23:54

## 2017-06-03 RX ADMIN — SODIUM CHLORIDE 100 ML: 900 INJECTION, SOLUTION INTRAVENOUS at 23:54

## 2017-06-03 RX ADMIN — IOPAMIDOL 100 ML: 755 INJECTION, SOLUTION INTRAVENOUS at 23:54

## 2017-06-04 VITALS
TEMPERATURE: 98 F | HEIGHT: 63 IN | OXYGEN SATURATION: 98 % | HEART RATE: 84 BPM | DIASTOLIC BLOOD PRESSURE: 60 MMHG | RESPIRATION RATE: 18 BRPM | BODY MASS INDEX: 29.62 KG/M2 | WEIGHT: 167.2 LBS | SYSTOLIC BLOOD PRESSURE: 118 MMHG

## 2017-06-04 LAB
ATRIAL RATE: 73 BPM
CALCULATED P AXIS, ECG09: 59 DEGREES
CALCULATED R AXIS, ECG10: -32 DEGREES
CALCULATED T AXIS, ECG11: 84 DEGREES
DIAGNOSIS, 93000: NORMAL
P-R INTERVAL, ECG05: 218 MS
Q-T INTERVAL, ECG07: 418 MS
QRS DURATION, ECG06: 120 MS
QTC CALCULATION (BEZET), ECG08: 460 MS
VENTRICULAR RATE, ECG03: 73 BPM

## 2017-06-04 PROCEDURE — 74011250636 HC RX REV CODE- 250/636: Performed by: EMERGENCY MEDICINE

## 2017-06-04 RX ORDER — POLYETHYLENE GLYCOL 3350 17 G/17G
17 POWDER, FOR SOLUTION ORAL 2 TIMES DAILY
Qty: 595 G | Refills: 0 | Status: SHIPPED | OUTPATIENT
Start: 2017-06-04

## 2017-06-04 RX ORDER — MAGNESIUM CITRATE
296 SOLUTION, ORAL ORAL
Qty: 1 BOTTLE | Refills: 1 | Status: SHIPPED | OUTPATIENT
Start: 2017-06-04 | End: 2017-06-04

## 2017-06-04 RX ORDER — IBUPROFEN 800 MG/1
800 TABLET ORAL
Qty: 10 TAB | Refills: 0 | Status: SHIPPED | OUTPATIENT
Start: 2017-06-04 | End: 2017-06-11

## 2017-06-04 RX ORDER — ADHESIVE BANDAGE
30 BANDAGE TOPICAL DAILY
Qty: 769 ML | Refills: 0 | Status: SHIPPED | OUTPATIENT
Start: 2017-06-04

## 2017-06-04 RX ADMIN — SODIUM CHLORIDE 500 ML: 900 INJECTION, SOLUTION INTRAVENOUS at 00:39

## 2017-06-04 NOTE — DISCHARGE INSTRUCTIONS
Constipation: Care Instructions  Your Care Instructions  Constipation means that you have a hard time passing stools (bowel movements). People pass stools from 3 times a day to once every 3 days. What is normal for you may be different. Constipation may occur with pain in the rectum and cramping. The pain may get worse when you try to pass stools. Sometimes there are small amounts of bright red blood on toilet paper or the surface of stools. This is because of enlarged veins near the rectum (hemorrhoids). A few changes in your diet and lifestyle may help you avoid ongoing constipation. Your doctor may also prescribe medicine to help loosen your stool. Some medicines can cause constipation. These include pain medicines and antidepressants. Tell your doctor about all the medicines you take. Your doctor may want to make a medicine change to ease your symptoms. Follow-up care is a key part of your treatment and safety. Be sure to make and go to all appointments, and call your doctor if you are having problems. It's also a good idea to know your test results and keep a list of the medicines you take. How can you care for yourself at home? · Drink plenty of fluids, enough so that your urine is light yellow or clear like water. If you have kidney, heart, or liver disease and have to limit fluids, talk with your doctor before you increase the amount of fluids you drink. · Include high-fiber foods in your diet each day. These include fruits, vegetables, beans, and whole grains. · Get at least 30 minutes of exercise on most days of the week. Walking is a good choice. You also may want to do other activities, such as running, swimming, cycling, or playing tennis or team sports. · Take a fiber supplement, such as Citrucel or Metamucil, every day. Read and follow all instructions on the label. · Schedule time each day for a bowel movement. A daily routine may help.  Take your time having your bowel movement. · Support your feet with a small step stool when you sit on the toilet. This helps flex your hips and places your pelvis in a squatting position. · Your doctor may recommend an over-the-counter laxative to relieve your constipation. Examples are Milk of Magnesia and MiraLax. Read and follow all instructions on the label. Do not use laxatives on a long-term basis. When should you call for help? Call your doctor now or seek immediate medical care if:  · You have new or worse belly pain. · You have new or worse nausea or vomiting. · You have blood in your stools. Watch closely for changes in your health, and be sure to contact your doctor if:  · Your constipation is getting worse. · You do not get better as expected. Where can you learn more? Go to http://taylor-egoff.info/. Enter 21 [id number] in the search box to learn more about \"Constipation: Care Instructions. \"  Current as of: May 27, 2016  Content Version: 11.2  © 8768-7814 [employer]. Care instructions adapted under license by [employer] (which disclaims liability or warranty for this information). If you have questions about a medical condition or this instruction, always ask your healthcare professional. [place] [address] any warranty or liability for your use of this information. Kidney Stone: Care Instructions  Your Care Instructions    Kidney stones are formed when salts, minerals, and other substances normally found in the urine clump together. They can be as small as grains of sand or, rarely, as large as golf balls. While the stone is traveling through the ureter, which is the tube that carries urine from the kidney to the bladder, you will probably feel pain. The pain may be mild or very severe. You may also have some blood in your urine. As soon as the stone reaches the bladder, any intense pain should go away.   If a stone is too large to pass on its own, you may need a medical procedure to help you pass the stone. The doctor has checked you carefully, but problems can develop later. If you notice any problems or new symptoms, get medical treatment right away. Follow-up care is a key part of your treatment and safety. Be sure to make and go to all appointments, and call your doctor if you are having problems. It's also a good idea to know your test results and keep a list of the medicines you take. How can you care for yourself at home? · Drink plenty of fluids, enough so that your urine is light yellow or clear like water. If you have kidney, heart, or liver disease and have to limit fluids, talk with your doctor before you increase the amount of fluids you drink. · Take pain medicines exactly as directed. Call your doctor if you think you are having a problem with your medicine. ¨ If the doctor gave you a prescription medicine for pain, take it as prescribed. ¨ If you are not taking a prescription pain medicine, ask your doctor if you can take an over-the-counter medicine. Read and follow all instructions on the label. · Your doctor may ask you to strain your urine so that you can collect your kidney stone when it passes. You can use a kitchen strainer or a tea strainer to catch the stone. Store it in a plastic bag until you see your doctor again. Preventing future kidney stones  Some changes in your diet may help prevent kidney stones. Depending on the cause of your stones, your doctor may recommend that you:  · Drink plenty of fluids, enough so that your urine is light yellow or clear like water. If you have kidney, heart, or liver disease and have to limit fluids, talk with your doctor before you increase the amount of fluids you drink. · Limit coffee, tea, and alcohol. Also avoid grapefruit juice. · Do not take more than the recommended daily dose of vitamins C and D.  · Avoid antacids such as Gaviscon, Maalox, Mylanta, or Tums.   · Limit the amount of salt (sodium) in your diet. · Eat a balanced diet that is not too high in protein. · Limit foods that are high in a substance called oxalate, which can cause kidney stones. These foods include dark green vegetables, rhubarb, chocolate, wheat bran, nuts, cranberries, and beans. When should you call for help? Call your doctor now or seek immediate medical care if:  · You cannot keep down fluids. · Your pain gets worse. · You have a fever or chills. · You have new or worse pain in your back just below your rib cage (the flank area). · You have new or more blood in your urine. Watch closely for changes in your health, and be sure to contact your doctor if:  · You do not get better as expected. Where can you learn more? Go to http://taylor-geoff.info/. Enter F459 in the search box to learn more about \"Kidney Stone: Care Instructions. \"  Current as of: November 20, 2015  Content Version: 11.2  © 4596-9701 [employer]. Care instructions adapted under license by [employer] (which disclaims liability or warranty for this information). If you have questions about a medical condition or this instruction, always ask your healthcare professional. [place] [address] any warranty or liability for your use of this information.

## 2017-06-04 NOTE — ED NOTES
Small results from enema, pt states he wants to go home, son states he will give prescription meds for constipation.

## 2017-06-04 NOTE — ED NOTES
I have reviewed discharge instructions with the patient. The patient verbalized understanding. son is  Care taker and states he will fill prescriptions and give as directed also stated he will \"try\" to change his diet.

## 2017-06-04 NOTE — ED PROVIDER NOTES
HPI Comments: 59-year-old male patient presents emergency Department with reports of mild abdominal pain and distention with constipation ×1 week. Patient is elderly and extremely hard of hearing which limits history at this time. Family at bedside states patient has a history of colon cancer and esophageal cancer for which he is undergoing treatment in the past.  He is currently receiving radiation therapy for his esophageal cancer at this time. Family states his appetite has been less than normal over the past several days however patient ate well today. He reports some mild nausea yesterday but there's been no reports of vomiting at all. Patient denies fever, chills, chest pain, shortness of breath. Patient has a history of constipation in the past was altered with enemas which family at bedside states they have tried at home. Patient passes a small amount of liquid stool otherwise there's been no stool production and approximately one week. Patient reports feelings of fullness and generalized discomfort but no severe pain in the abdomen. He denies any changes in his bladder habits    Patient is a 80 y.o. male presenting with abdominal pain. The history is provided by the patient and a relative. No  was used. Abdominal Pain    This is a new problem. The current episode started more than 2 days ago. The problem occurs constantly. The problem has not changed since onset. The pain is located in the generalized abdominal region. The quality of the pain is aching and dull. Associated symptoms include diarrhea, nausea and constipation. Pertinent negatives include no anorexia, no fever, no belching, no flatus, no hematochezia, no melena, no vomiting, no dysuria, no frequency, no hematuria, no headaches, no arthralgias, no myalgias, no trauma, no chest pain, no testicular pain and no back pain. Nothing worsens the pain. The pain is relieved by nothing. Past workup includes surgery.  His past medical history is significant for cancer. The patient's surgical history includes colectomy. Past Medical History:   Diagnosis Date    Arthritis     back and shoulders; gets injections in shoulders and back every 3 months    B12 deficiency 1/28/2016    Benign prostatic hypertrophy     Cancer ([facility] [address]     colon//// pt has neuropathy in feet and finder tips since chemo    Chronic pain     neuropathy feet    Depression     Edema 1/11/2016    Eye infection     bilateral eyes~treated with long term use Minocycline    GERD (gastroesophageal reflux disease)     controlled with med    Gout     Hypertension     controlled with meds    Nausea & vomiting     Neurological disorder     from chemotherapy    Osteomyelitis ([facility] [address] 1/11/2016    Other ill-defined conditions     rt knee pain       Past Surgical History:   Procedure Laterality Date    ABDOMEN SURGERY PROC UNLISTED  2002    part of colon removed from colon cancer    ABDOMEN SURGERY PROC UNLISTED      corazon ing hernia repair    HX CATARACT REMOVAL Bilateral     HX HEENT Right 03/01/2017    Excisional bx of R neck mass- Sophia Art    HX KNEE ARTHROSCOPY  2009    left    HX KNEE ARTHROSCOPY  2010    right knee    HX KNEE REPLACEMENT  2009    right knee    HX UROLOGICAL  1982    kidney stone removal         Family History:   Problem Relation Age of Onset    No Known Problems Mother     Malignant Hyperthermia Neg Hx     Pseudocholinesterase Deficiency Neg Hx     Delayed Awakening Neg Hx     Post-op Nausea/Vomiting Neg Hx     Emergence Delirium Neg Hx     Post-op Cognitive Dysfunction Neg Hx     Other Neg Hx        Social History     Social History    Marital status:      Spouse name: N/A    Number of children: N/A    Years of education: N/A     Occupational History    Not on file.      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 Worked as a , prior to admission was independent with ADL, IADL, and still driving as per son -in-law, no tobacco no alcohol. ALLERGIES: Codeine    Review of Systems   Constitutional: Negative for chills, diaphoresis and fever. HENT: Negative for congestion, sneezing and sore throat. Eyes: Negative for visual disturbance. Respiratory: Negative for cough, chest tightness, shortness of breath and wheezing. Cardiovascular: Negative for chest pain and leg swelling. Gastrointestinal: Positive for abdominal pain, constipation, diarrhea and nausea. Negative for anorexia, blood in stool, flatus, hematochezia, melena and vomiting. Endocrine: Negative for polyuria. Genitourinary: Negative for difficulty urinating, dysuria, flank pain, frequency, hematuria, testicular pain and urgency. Musculoskeletal: Negative for arthralgias, back pain, myalgias, neck pain and neck stiffness. Skin: Negative for color change and rash. Neurological: Negative for dizziness, syncope, speech difficulty, weakness, light-headedness, numbness and headaches. Psychiatric/Behavioral: Negative for behavioral problems. All other systems reviewed and are negative. Vitals:    06/03/17 2218   BP: 117/58   Pulse: (!) 104   Resp: 18   Temp: 98.5 °F (36.9 °C)   SpO2: 99%   Weight: 75.8 kg (167 lb 3.2 oz)   Height: 5' 3\" (1.6 m)            Physical Exam   Constitutional: He is oriented to person, place, and time. He appears well-developed and well-nourished. No distress. Alert and oriented to person, place and time. No acute distress. Speaks in clear, fluent sentences. HENT:   Head: Normocephalic and atraumatic.    Nose: Nose normal.   Mouth/Throat: Oropharynx is clear and moist. Eyes: Conjunctivae and EOM are normal. Pupils are equal, round, and reactive to light. Neck: Normal range of motion. Neck supple. No JVD present. No tracheal deviation present. Cardiovascular: Normal rate, regular rhythm, S1 normal, S2 normal, normal heart sounds and intact distal pulses. Exam reveals no gallop, no distant heart sounds and no friction rub. No murmur heard. Pulmonary/Chest: Effort normal and breath sounds normal. No accessory muscle usage or stridor. No tachypnea and no bradypnea. No respiratory distress. He has no decreased breath sounds. He has no wheezes. He has no rhonchi. He has no rales. He exhibits no tenderness. Abdominal: Soft. Normal appearance. He exhibits distension. He exhibits no mass. There is no hepatosplenomegaly, splenomegaly or hepatomegaly. There is no tenderness. There is no rigidity, no rebound, no guarding, no CVA tenderness, no tenderness at McBurney's point and negative Mccoy's sign. Slight distention on exam.  Faint bowel sounds present throughout. Well-healed surgical scar overlies ventral abdomen. Patient denies severe pain with palpation of the abdomen. No focal findings. Musculoskeletal: Normal range of motion. He exhibits no edema, tenderness or deformity. Neurological: He is alert and oriented to person, place, and time. No cranial nerve deficit. Skin: Skin is warm and dry. No rash noted. He is not diaphoretic. Psychiatric: He has a normal mood and affect. His behavior is normal.   Nursing note and vitals reviewed. MDM  Number of Diagnoses or Management Options  Constipation, unspecified constipation type: new and requires workup  Kidney stone: new and requires workup  Diagnosis management comments: Laboratory evaluation unremarkable. CT imaging shows no evidence of obstructive pathology. There is prominent stool in the rectal vault. No surrounding inflammatory changes.   Of note there is also a left-sided ureteral stone which may explain some of patient's discomfort. Discussed his findings with patient and family bedside. Patient's family states they've attempted enema menstruation at home though unsuccessfully. We will attempt an enema at this time and had several medications the patient's current regiment. He is currently taking Lortab which has likely contributed to his constipation. Final output with enema administered during patient's stay. Nurse reports no  Palpable stool in the rectal vault and CT imaging shows evidence of stool in the sigmoid colon likely unacceptable with enema usage. Given this finding we will treat patient's discomfort with any anti-inflammatories secondary to findings of renal stone and prescribed medications to assist with constipation at home. His laboratory evaluation is unremarkable and I can identify no surgical pathology on today's evaluation or CT imaging. Patient is stable for discharge with close outpatient follow-up.        Amount and/or Complexity of Data Reviewed  Clinical lab tests: ordered and reviewed  Tests in the radiology section of CPT®: ordered and reviewed  Tests in the medicine section of CPT®: ordered and reviewed  Independent visualization of images, tracings, or specimens: yes    Risk of Complications, Morbidity, and/or Mortality  Presenting problems: moderate  Diagnostic procedures: low  Management options: moderate    Patient Progress  Patient progress: stable    ED Course       Procedures

## 2017-06-05 ENCOUNTER — PATIENT OUTREACH (OUTPATIENT)
Dept: CASE MANAGEMENT | Age: 82
End: 2017-06-05

## 2017-06-05 NOTE — PROGRESS NOTES
This note will not be viewable in [address] Transition of Care Discharge Follow-Up Questionnaire         Date/Time of Call:   June 5, 2017 3:45PM   What was the patient hospitalized for? Patient seen in ED for Constipation and Kidney Stone. Does the patient understand his/her diagnosis and/or treatment and what happened during the hospitalization? Patient states understanding of diagnosis and treatment during hospitalization. Did the patient receive discharge instructions? Patient states discharge instructions explained and received before discharge to home. Review any discharge instructions (see notes in [facility]). Ask patient if they understand these. Do they have any questions? Patient states no questions regarding discharge instructions. Were home services ordered (nursing, PT, OT, ST, etc.)? No home health services ordered. If so, has the first visit occurred? If not, why? (Assist with coordination of services if necessary. ) NA         Was any DME ordered? No durable medical equipment ordered. If so, has it been received? If not, why?  (Assist with coordination of arranging DME orders if necessary. ) NA         Complete a review of all medications (new, continued and discontinued meds per the D/C instructions and medication tab in [facility]). Care Coordinator reviewed all medications with patient per connect [facility], several new medications prescribed prior to discharge to home. Were all new prescriptions filled? If not, why?  (Assist with obtainment of medications if necessary.) Patient states new prescriptions filled and currently being taken per doctors order. Does the patient understand the purpose and dosing instructions for all medications? (If patient has questions, provide explanation and education.) Patient states understanding of medication purposes and dosing instructions.    Does the patient have any problems in performing ADLs? (If patient is unable to perform ADLs  what is the limiting factor(s)? Do they have a support system that can assist? If no support system is present, discuss possible assistance that they may be able to obtain.) Patient states he is independent with ADL\"s and ambulation. Patient states he is doing fine and have no questions or concerns. Does the patient have all follow-up appointments scheduled? Has transportation been arranged? [facility] follow-up should be within 7 days of discharge; all others should have PCP follow-up within 7 days of discharge; follow-ups with other specialists as appropriate or ordered.) Patient states follow up appointment scheduled with [facility], June 28, 2017 @ 10:40AM with Dr. Antoine Reece. Patient states no transportation needs. Any other questions or concerns expressed by the patient? Patient states no questions or concerns. Schedule next appointment with Carry Penta Coordinator or refer to RN Case Manager/  as appropriate. No further needs identified, patient instructed to call Care Coordinator if further questions or concerns arise.    MINERVA Call Completed By: Amy Medina LPN  Care Coordinator   [facility]

## 2017-06-28 PROBLEM — C11.9 NASOPHARYNX CANCER (HCC): Status: ACTIVE | Noted: 2017-05-30

## 2017-06-28 PROBLEM — C11.9 NASOPHARYNX CANCER (HCC): Chronic | Status: ACTIVE | Noted: 2017-05-30

## 2017-09-18 PROBLEM — N18.30 STAGE 3 CHRONIC KIDNEY DISEASE (HCC): Chronic | Status: ACTIVE | Noted: 2017-09-18

## (undated) DEVICE — SUTURE PERMAHAND SZ 3-0 L18IN NONABSORBABLE BLK SILK BRAID A184H

## (undated) DEVICE — INSULATED BLADE ELECTRODE: Brand: EDGE

## (undated) DEVICE — 2000CC GUARDIAN II: Brand: GUARDIAN

## (undated) DEVICE — SUTURE VCRL SZ 5-0 L18IN ABSRB UD L13MM P-3 3/8 CIR PRIM J493G

## (undated) DEVICE — STERILE SLEEVE: Brand: CONVERTORS

## (undated) DEVICE — HEAD AND NECK: Brand: MEDLINE INDUSTRIES, INC.

## (undated) DEVICE — BANDAGE,GAUZE,CONFORMING,3"X75",STRL,LF: Brand: MEDLINE

## (undated) DEVICE — BUTTON SWITCH PENCIL BLADE ELECTRODE, HOLSTER: Brand: EDGE

## (undated) DEVICE — SOLUTION IV 1000ML 0.9% SOD CHL

## (undated) DEVICE — SUTURE VCRL SZ 3-0 L27IN ABSRB UD L26MM SH 1/2 CIR J416H

## (undated) DEVICE — PACKING 8004003 NEURAY 200PK 25X25MM: Brand: NEURAY ®

## (undated) DEVICE — DRAPE TWL SURG 16X26IN BLU ORB04] ALLCARE INC]

## (undated) DEVICE — AMD ANTIMICROBIAL SUPER SPONGES,MEDIUM: Brand: KERLIX

## (undated) DEVICE — SYRINGE NDL 25GA 1ML L5/8IN BLU PLAS NDL S STL SHLD HYPO

## (undated) DEVICE — Z CONVERTED USE 2421973 CONTAINER SPEC 60/30ML 10% FRMLN POLYPR PREFIL

## (undated) DEVICE — STIMULATOR 8562010 VARI-STIM 10PK ROHS: Brand: VARI-STIM®

## (undated) DEVICE — SPONGE DISSECT PNUT SM 3/8IN -- 5/PK

## (undated) DEVICE — SKIN STAPLER: Brand: SIGNET

## (undated) DEVICE — SUT ETHLN 5-0 18IN P3 BLK --

## (undated) DEVICE — CONTAINER,SPECIMEN,O.R.STRL,4.5OZ: Brand: MEDLINE

## (undated) DEVICE — SOL ANTI-FOG 6ML MEDC -- MEDICHOICE - CONVERT TO 358427

## (undated) DEVICE — (D)SUT PLN FST 6-0 18IN PC1 -- DISC BY MFR

## (undated) DEVICE — PAD,NON-ADHERENT,3X8,STERILE,LF,1/PK: Brand: MEDLINE

## (undated) DEVICE — MEDI-VAC NON-CONDUCTIVE SUCTION TUBING: Brand: CARDINAL HEALTH

## (undated) DEVICE — REM POLYHESIVE ADULT PATIENT RETURN ELECTRODE: Brand: VALLEYLAB

## (undated) DEVICE — TRAY PREP DRY W/ PREM GLV 2 APPL 6 SPNG 2 UNDPD 1 OVERWRAP